# Patient Record
Sex: MALE | Race: BLACK OR AFRICAN AMERICAN | NOT HISPANIC OR LATINO | Employment: FULL TIME | URBAN - METROPOLITAN AREA
[De-identification: names, ages, dates, MRNs, and addresses within clinical notes are randomized per-mention and may not be internally consistent; named-entity substitution may affect disease eponyms.]

---

## 2017-03-27 ENCOUNTER — OFFICE VISIT - HEALTHEAST (OUTPATIENT)
Dept: FAMILY MEDICINE | Facility: CLINIC | Age: 47
End: 2017-03-27

## 2017-03-27 DIAGNOSIS — S83.411A SPRAIN OF MEDIAL COLLATERAL LIGAMENT OF RIGHT KNEE, INITIAL ENCOUNTER: ICD-10-CM

## 2017-03-27 ASSESSMENT — MIFFLIN-ST. JEOR: SCORE: 1871.17

## 2019-11-04 ENCOUNTER — HEALTH MAINTENANCE LETTER (OUTPATIENT)
Age: 49
End: 2019-11-04

## 2019-12-09 ENCOUNTER — OFFICE VISIT - HEALTHEAST (OUTPATIENT)
Dept: FAMILY MEDICINE | Facility: CLINIC | Age: 49
End: 2019-12-09

## 2019-12-09 DIAGNOSIS — Z12.11 SCREEN FOR COLON CANCER: ICD-10-CM

## 2019-12-09 DIAGNOSIS — M25.562 ACUTE PAIN OF LEFT KNEE: ICD-10-CM

## 2019-12-09 ASSESSMENT — MIFFLIN-ST. JEOR: SCORE: 1893.85

## 2019-12-26 ENCOUNTER — RECORDS - HEALTHEAST (OUTPATIENT)
Dept: ADMINISTRATIVE | Facility: OTHER | Age: 49
End: 2019-12-26

## 2019-12-26 ENCOUNTER — THERAPY VISIT (OUTPATIENT)
Dept: PHYSICAL THERAPY | Facility: CLINIC | Age: 49
End: 2019-12-26
Payer: COMMERCIAL

## 2019-12-26 DIAGNOSIS — M25.562 LEFT KNEE PAIN: ICD-10-CM

## 2019-12-26 PROCEDURE — 97110 THERAPEUTIC EXERCISES: CPT | Mod: GP | Performed by: PHYSICAL THERAPIST

## 2019-12-26 PROCEDURE — 97161 PT EVAL LOW COMPLEX 20 MIN: CPT | Mod: GP | Performed by: PHYSICAL THERAPIST

## 2019-12-26 ASSESSMENT — ACTIVITIES OF DAILY LIVING (ADL)
GO DOWN STAIRS: ACTIVITY IS SOMEWHAT DIFFICULT
STIFFNESS: THE SYMPTOM AFFECTS MY ACTIVITY MODERATELY
STAND: ACTIVITY IS SOMEWHAT DIFFICULT
WALK: ACTIVITY IS SOMEWHAT DIFFICULT
SIT WITH YOUR KNEE BENT: ACTIVITY IS FAIRLY DIFFICULT
RAW_SCORE: 41
GIVING WAY, BUCKLING OR SHIFTING OF KNEE: THE SYMPTOM AFFECTS MY ACTIVITY SLIGHTLY
RISE FROM A CHAIR: ACTIVITY IS MINIMALLY DIFFICULT
WEAKNESS: I HAVE THE SYMPTOM BUT IT DOES NOT AFFECT MY ACTIVITY
SWELLING: I DO NOT HAVE THE SYMPTOM
KNEEL ON THE FRONT OF YOUR KNEE: ACTIVITY IS FAIRLY DIFFICULT
PAIN: THE SYMPTOM AFFECTS MY ACTIVITY MODERATELY
HOW_WOULD_YOU_RATE_THE_CURRENT_FUNCTION_OF_YOUR_KNEE_DURING_YOUR_USUAL_DAILY_ACTIVITIES_ON_A_SCALE_FROM_0_TO_100_WITH_100_BEING_YOUR_LEVEL_OF_KNEE_FUNCTION_PRIOR_TO_YOUR_INJURY_AND_0_BEING_THE_INABILITY_TO_PERFORM_ANY_OF_YOUR_USUAL_DAILY_ACTIVITIES?: 30
KNEE_ACTIVITY_OF_DAILY_LIVING_SCORE: 58.57
HOW_WOULD_YOU_RATE_THE_OVERALL_FUNCTION_OF_YOUR_KNEE_DURING_YOUR_USUAL_DAILY_ACTIVITIES?: SEVERELY ABNORMAL
SQUAT: ACTIVITY IS FAIRLY DIFFICULT
GO UP STAIRS: ACTIVITY IS SOMEWHAT DIFFICULT
LIMPING: THE SYMPTOM AFFECTS MY ACTIVITY SLIGHTLY
KNEE_ACTIVITY_OF_DAILY_LIVING_SUM: 41
AS_A_RESULT_OF_YOUR_KNEE_INJURY,_HOW_WOULD_YOU_RATE_YOUR_CURRENT_LEVEL_OF_DAILY_ACTIVITY?: NEARLY NORMAL

## 2019-12-26 NOTE — LETTER
[facility]  [address] [place] [place] [address]  [phone]    2019    Re: Tushar Nix   :   1970  MRN:  [id number]   REFERRING PHYSICIAN:   Alice Domínguez    [facility]    Date of Initial Evaluation: 2019  Visits:  Rxs Used: 1  Reason for Referral:  Left knee pain    EVALUATION SUMMARY    [facility] Initial Evaluation  Subjective:  The history is provided by the patient. No  was used.   Type of problem:  Left knee   Condition occurred with:  Other reason and insidious onset. This is a new condition   Problem details: Pt reports he usually works out atleast 5 days per week. Pt reports he was running 5 of those days plus weight lifting prior to the knee pain starting (running atleast 2 miles). Pt reports this has been going on for over 2 months. Pt is really unsure of how this happened. Pt reports he was in [facility] during the month of August and noticed when he resumed his work out the following month (after returning to [place]) is around when it started. Pt reports that his left knee feels like it doesn't tolerate many positions for a long period of time. Pt describes left medial knee pain that is throbbing, intermittently. Pt reports always worse after it being bent for more than 10 minutes, always worse if straight for too long as well. Pt indicates pain with running that makes him limp, always. Pt reports always better after walking and loosening it up for a few minutes. Pt would like to be able to walk, sit and work out without increased pain..           Tushar Nix being seen for Right Knee Pain.   Problem began 10/26/2019. Where condition occurred: for unknown reasons.Problem occurred: Unsure  and reported as 6/10 on pain scale. General health as reported by patient is excellent. Health conditions: none indicated.  Medical allergies: none.  Surgeries include:  None.     Primary job tasks  include:  Computer work and prolonged sitting.  Pain quality: throbbing. and is intermittent.  Since onset symptoms are gradually worsening. Imaging testing: none.     Patient is Respiratory Therapist..   Barriers include:  None as reported by patient.  Red flags:  None as reported by patient.                                Re: Tushar Nix   :   1970    Objective:          Hip Evaluation  Hip Strength:    Abduction:  Left: 3+/5      Pain:strong/pain freeRight: 4/5     Pain:strong/pain free  Adduction:  Left: /5   Pain:strong/pain free  Internal Rotation:  Left: 4/5     Pain:strong/pain freeRight: 4+/5    Pain:strong/pain free  External Rotation:  Left: 3+/5    Pain: strong/pain free  Right: 4+/5    Pain: strong/pain free         Knee Evaluation:  ROM:    AROM  Extension:  Left: lacking 2*    Right:  3* hyperextension  Flexion: Left: 140    Right: 140    Pain: Increased pain during Left knee flexion.    Strength:   Extension:  Left: 4-/5   Strong/painful  Pain:      Right: 5/5   Strong/pain free  Pain:  Flexion:  Left: 4/5   Strong/pain free  Pain:      Right: 4+/5   Strong/pain free  Pain:    Quad Set Left:  Fair and delayed    Pain: +/-   Quad Set Right:  Good and WNL    Pain: -    Special Tests: Special test for knee: + hamstring tightness BLE. 90/90 testing lacking 45* extension L, lacking 30* extension.      Functional Testing:      Quad:    Single Leg Squat:  Left:         10  Significant loss of control, decreased hip/trunk flexion, excessive anterior knee excursion and femoral IR  Right:      25  Mild loss of control, excessive anterior knee excursion and decreased hip/trunk flexion  Bilateral Leg Squat:  70  Mild loss of control, excessive anterior knee excursion and decreased hip/trunk flexion          Assessment/Plan:    Patient is a 49 year old male with left side knee complaints.    Patient has the following significant findings with corresponding treatment plan.                Diagnosis 1:   Left Knee Pain  Pain -  manual therapy, self management, education and home program  Decreased ROM/flexibility - manual therapy, therapeutic exercise and therapeutic activity  Decreased strength - therapeutic exercise, therapeutic activities and home program        Re: Tushar Nix   :   1970    Previous and current functional limitations:  (See Goal Flow Sheet for this information)    Short term and Long term goals: (See Goal Flow Sheet for this information)     Communication ability:  Patient appears to be able to clearly communicate and understand verbal and written communication and follow directions correctly.  Treatment Explanation - The following has been discussed with the patient:   RX ordered/plan of care  Anticipated outcomes  Possible risks and side effects  This patient would benefit from PT intervention to resume normal activities.   Rehab potential is excellent.    Frequency:  1 X week, once daily  Duration:  for 6 weeks  Discharge Plan:  Achieve all LTG.  Independent in home treatment program.  Reach maximal therapeutic benefit.          Thank you for your referral.    INQUIRIES  Therapist: [facility]   [facility]  [address] [place] [place] [address]  Phone: [phone]

## 2019-12-26 NOTE — PROGRESS NOTES
[facility] Initial Evaluation  Subjective:  The history is provided by the patient. No  was used.   Type of problem:  Left knee   Condition occurred with:  Other reason and insidious onset. This is a new condition   Problem details: Pt reports he usually works out atleast 5 days per week. Pt reports he was running 5 of those days plus weight lifting prior to the knee pain starting (running atleast 2 miles). Pt reports this has been going on for over 2 months. Pt is really unsure of how this happened. Pt reports he was in [facility] during the month of August and noticed when he resumed his work out the following month (after returning to [place]) is around when it started. Pt reports that his left knee feels like it doesn't tolerate many positions for a long period of time. Pt describes left medial knee pain that is throbbing, intermittently. Pt reports always worse after it being bent for more than 10 minutes, always worse if straight for too long as well. Pt indicates pain with running that makes him limp, always. Pt reports always better after walking and loosening it up for a few minutes. Pt would like to be able to walk, sit and work out without increased pain..           Tushar Nix being seen for Right Knee Pain.   Problem began 10/26/2019. Where condition occurred: for unknown reasons.Problem occurred: Unsure  and reported as 6/10 on pain scale. General health as reported by patient is excellent. Health conditions: none indicated.  Medical allergies: none.  Surgeries include:  None.     Primary job tasks include:  Computer work and prolonged sitting.  Pain quality: throbbing. and is intermittent.  Since onset symptoms are gradually worsening. Imaging testing: none.     Patient is Respiratory Therapist..   Barriers include:  None as reported by patient.  Red flags:  None as reported by patient.                      Objective:  System                                            Hip Evaluation    Hip Strength:        Abduction:  Left: 3+/5      Pain:strong/pain freeRight: 4/5     Pain:strong/pain free  Adduction:  Left: /5   Pain:strong/pain free  Internal Rotation:  Left: 4/5     Pain:strong/pain freeRight: 4+/5    Pain:strong/pain free  External Rotation:  Left: 3+/5    Pain: strong/pain free  Right: 4+/5    Pain: strong/pain free                     Knee Evaluation:  ROM:    AROM      Extension:  Left: lacking 2*    Right:  3* hyperextension  Flexion: Left: 140    Right: 140    Pain: Increased pain during Left knee flexion.    Strength:     Extension:  Left: 4-/5   Strong/painful  Pain:      Right: 5/5   Strong/pain free  Pain:  Flexion:  Left: 4/5   Strong/pain free  Pain:      Right: 4+/5   Strong/pain free  Pain:    Quad Set Left:  Fair and delayed    Pain: +/-   Quad Set Right:  Good and WNL    Pain: -    Special Tests: Special test for knee: + hamstring tightness BLE. 90/90 testing lacking 45* extension L, lacking 30* extension.            Functional Testing:          Quad:    Single Leg Squat:  Left:         10  Significant loss of control, decreased hip/trunk flexion, excessive anterior knee excursion and femoral IR  Right:      25  Mild loss of control, excessive anterior knee excursion and decreased hip/trunk flexion  Bilateral Leg Squat:  70  Mild loss of control, excessive anterior knee excursion and decreased hip/trunk flexion              General     ROS    Assessment/Plan:    Patient is a 49 year old male with left side knee complaints.    Patient has the following significant findings with corresponding treatment plan.                Diagnosis 1:  Left Knee Pain  Pain -  manual therapy, self management, education and home program  Decreased ROM/flexibility - manual therapy, therapeutic exercise and therapeutic activity  Decreased strength - therapeutic exercise, therapeutic activities and home program      Previous and current functional limitations:  (See Goal Flow  Sheet for this information)    Short term and Long term goals: (See Goal Flow Sheet for this information)     Communication ability:  Patient appears to be able to clearly communicate and understand verbal and written communication and follow directions correctly.  Treatment Explanation - The following has been discussed with the patient:   RX ordered/plan of care  Anticipated outcomes  Possible risks and side effects  This patient would benefit from PT intervention to resume normal activities.   Rehab potential is excellent.    Frequency:  1 X week, once daily  Duration:  for 6 weeks  Discharge Plan:  Achieve all LTG.  Independent in home treatment program.  Reach maximal therapeutic benefit.    Please refer to the daily flowsheet for treatment today, total treatment time and time spent performing 1:1 timed codes.

## 2020-01-02 ENCOUNTER — THERAPY VISIT (OUTPATIENT)
Dept: PHYSICAL THERAPY | Facility: CLINIC | Age: 50
End: 2020-01-02
Payer: COMMERCIAL

## 2020-01-02 DIAGNOSIS — M25.562 LEFT KNEE PAIN: ICD-10-CM

## 2020-01-02 PROCEDURE — 97110 THERAPEUTIC EXERCISES: CPT | Mod: GP | Performed by: PHYSICAL THERAPIST

## 2020-01-30 ENCOUNTER — OFFICE VISIT - HEALTHEAST (OUTPATIENT)
Dept: FAMILY MEDICINE | Facility: CLINIC | Age: 50
End: 2020-01-30

## 2020-01-30 DIAGNOSIS — M25.562 CHRONIC PAIN OF LEFT KNEE: ICD-10-CM

## 2020-01-30 DIAGNOSIS — G89.29 CHRONIC PAIN OF LEFT KNEE: ICD-10-CM

## 2020-01-30 ASSESSMENT — MIFFLIN-ST. JEOR: SCORE: 1884.78

## 2020-02-03 ENCOUNTER — RECORDS - HEALTHEAST (OUTPATIENT)
Dept: ADMINISTRATIVE | Facility: OTHER | Age: 50
End: 2020-02-03

## 2020-02-05 ENCOUNTER — HOSPITAL ENCOUNTER (OUTPATIENT)
Dept: MRI IMAGING | Facility: CLINIC | Age: 50
Discharge: HOME | End: 2020-02-05
Attending: FAMILY MEDICINE

## 2020-02-05 ENCOUNTER — COMMUNICATION - HEALTHEAST (OUTPATIENT)
Dept: FAMILY MEDICINE | Facility: CLINIC | Age: 50
End: 2020-02-05

## 2020-02-05 DIAGNOSIS — M25.562 LEFT KNEE PAIN, UNSPECIFIED CHRONICITY: ICD-10-CM

## 2020-02-05 DIAGNOSIS — G89.29 CHRONIC PAIN OF LEFT KNEE: ICD-10-CM

## 2020-02-05 DIAGNOSIS — M25.562 CHRONIC PAIN OF LEFT KNEE: ICD-10-CM

## 2020-02-14 ENCOUNTER — RECORDS - HEALTHEAST (OUTPATIENT)
Dept: ADMINISTRATIVE | Facility: OTHER | Age: 50
End: 2020-02-14

## 2020-03-16 ENCOUNTER — COMMUNICATION - HEALTHEAST (OUTPATIENT)
Dept: FAMILY MEDICINE | Facility: CLINIC | Age: 50
End: 2020-03-16

## 2020-03-17 ENCOUNTER — OFFICE VISIT - HEALTHEAST (OUTPATIENT)
Dept: FAMILY MEDICINE | Facility: CLINIC | Age: 50
End: 2020-03-17

## 2020-03-17 DIAGNOSIS — Z01.818 PREOP GENERAL PHYSICAL EXAM: ICD-10-CM

## 2020-03-17 DIAGNOSIS — S83.282A TEAR OF LATERAL CARTILAGE OR MENISCUS OF KNEE, CURRENT, LEFT, INITIAL ENCOUNTER: ICD-10-CM

## 2020-03-17 LAB
ERYTHROCYTE [DISTWIDTH] IN BLOOD BY AUTOMATED COUNT: 12.4 % (ref 11–14.5)
HCT VFR BLD AUTO: 43.3 % (ref 40–54)
HGB BLD-MCNC: 14.6 G/DL (ref 14–18)
MCH RBC QN AUTO: 28.7 PG (ref 27–34)
MCHC RBC AUTO-ENTMCNC: 33.7 G/DL (ref 32–36)
MCV RBC AUTO: 85 FL (ref 80–100)
PLATELET # BLD AUTO: 188 THOU/UL (ref 140–440)
PMV BLD AUTO: 7.4 FL (ref 7–10)
RBC # BLD AUTO: 5.07 MILL/UL (ref 4.4–6.2)
WBC: 3.8 THOU/UL (ref 4–11)

## 2020-03-17 ASSESSMENT — MIFFLIN-ST. JEOR: SCORE: 1890.45

## 2020-05-12 ENCOUNTER — COMMUNICATION - HEALTHEAST (OUTPATIENT)
Dept: FAMILY MEDICINE | Facility: CLINIC | Age: 50
End: 2020-05-12

## 2020-05-13 ENCOUNTER — OFFICE VISIT - HEALTHEAST (OUTPATIENT)
Dept: FAMILY MEDICINE | Facility: CLINIC | Age: 50
End: 2020-05-13

## 2020-05-13 DIAGNOSIS — Z01.818 PREOP GENERAL PHYSICAL EXAM: ICD-10-CM

## 2020-05-13 DIAGNOSIS — S83.282D TEAR OF LATERAL CARTILAGE OR MENISCUS OF KNEE, CURRENT, LEFT, SUBSEQUENT ENCOUNTER: ICD-10-CM

## 2020-05-13 ASSESSMENT — MIFFLIN-ST. JEOR: SCORE: 1893.32

## 2020-05-16 ENCOUNTER — OFFICE VISIT - HEALTHEAST (OUTPATIENT)
Dept: FAMILY MEDICINE | Facility: CLINIC | Age: 50
End: 2020-05-16

## 2020-05-16 DIAGNOSIS — Z01.818 PREOP GENERAL PHYSICAL EXAM: ICD-10-CM

## 2020-05-17 LAB
SARS-COV-2 PCR COMMENT: NORMAL
SARS-COV-2 RNA SPEC QL NAA+PROBE: NEGATIVE
SARS-COV-2 VIRUS SPECIMEN SOURCE: NORMAL

## 2020-05-18 ENCOUNTER — RECORDS - HEALTHEAST (OUTPATIENT)
Dept: ADMINISTRATIVE | Facility: OTHER | Age: 50
End: 2020-05-18

## 2020-06-01 ENCOUNTER — RECORDS - HEALTHEAST (OUTPATIENT)
Dept: ADMINISTRATIVE | Facility: OTHER | Age: 50
End: 2020-06-01

## 2020-06-29 ENCOUNTER — RECORDS - HEALTHEAST (OUTPATIENT)
Dept: ADMINISTRATIVE | Facility: OTHER | Age: 50
End: 2020-06-29

## 2020-11-16 ENCOUNTER — HEALTH MAINTENANCE LETTER (OUTPATIENT)
Age: 50
End: 2020-11-16

## 2021-04-19 ENCOUNTER — RECORDS - HEALTHEAST (OUTPATIENT)
Dept: ADMINISTRATIVE | Facility: OTHER | Age: 51
End: 2021-04-19

## 2021-04-28 ENCOUNTER — RECORDS - HEALTHEAST (OUTPATIENT)
Dept: ADMINISTRATIVE | Facility: OTHER | Age: 51
End: 2021-04-28

## 2021-05-24 ENCOUNTER — OFFICE VISIT - HEALTHEAST (OUTPATIENT)
Dept: FAMILY MEDICINE | Facility: CLINIC | Age: 51
End: 2021-05-24

## 2021-05-24 DIAGNOSIS — R76.11 NONSPECIFIC REACTION TO TUBERCULIN SKIN TEST: ICD-10-CM

## 2021-05-24 DIAGNOSIS — Z00.00 ROUTINE GENERAL MEDICAL EXAMINATION AT A HEALTH CARE FACILITY: ICD-10-CM

## 2021-05-24 DIAGNOSIS — E78.89 OTHER LIPOPROTEIN METABOLISM DISORDERS: ICD-10-CM

## 2021-05-24 LAB
ALBUMIN SERPL-MCNC: 3.7 G/DL (ref 3.5–5)
ALP SERPL-CCNC: 85 U/L (ref 45–120)
ALT SERPL W P-5'-P-CCNC: 18 U/L (ref 0–45)
ANION GAP SERPL CALCULATED.3IONS-SCNC: 11 MMOL/L (ref 5–18)
AST SERPL W P-5'-P-CCNC: 17 U/L (ref 0–40)
BILIRUB SERPL-MCNC: 0.6 MG/DL (ref 0–1)
BUN SERPL-MCNC: 11 MG/DL (ref 8–22)
CALCIUM SERPL-MCNC: 8.6 MG/DL (ref 8.5–10.5)
CHLORIDE BLD-SCNC: 110 MMOL/L (ref 98–107)
CHOLEST SERPL-MCNC: 201 MG/DL
CO2 SERPL-SCNC: 24 MMOL/L (ref 22–31)
CREAT SERPL-MCNC: 1.04 MG/DL (ref 0.7–1.3)
ERYTHROCYTE [DISTWIDTH] IN BLOOD BY AUTOMATED COUNT: 13.8 % (ref 11–14.5)
FASTING STATUS PATIENT QL REPORTED: YES
GFR SERPL CREATININE-BSD FRML MDRD: >60 ML/MIN/1.73M2
GLUCOSE BLD-MCNC: 90 MG/DL (ref 70–125)
HCT VFR BLD AUTO: 44.5 % (ref 40–54)
HDLC SERPL-MCNC: 34 MG/DL
HGB BLD-MCNC: 14.2 G/DL (ref 14–18)
LDLC SERPL CALC-MCNC: 143 MG/DL
MCH RBC QN AUTO: 27.3 PG (ref 27–34)
MCHC RBC AUTO-ENTMCNC: 31.9 G/DL (ref 32–36)
MCV RBC AUTO: 85 FL (ref 80–100)
PLATELET # BLD AUTO: 213 THOU/UL (ref 140–440)
PMV BLD AUTO: 9.2 FL (ref 7–10)
POTASSIUM BLD-SCNC: 4.7 MMOL/L (ref 3.5–5)
PROT SERPL-MCNC: 6.7 G/DL (ref 6–8)
PSA SERPL-MCNC: 1.1 NG/ML (ref 0–3.5)
RBC # BLD AUTO: 5.21 MILL/UL (ref 4.4–6.2)
SODIUM SERPL-SCNC: 145 MMOL/L (ref 136–145)
TRIGL SERPL-MCNC: 120 MG/DL
WBC: 3.8 THOU/UL (ref 4–11)

## 2021-05-24 ASSESSMENT — MIFFLIN-ST. JEOR: SCORE: 1872.52

## 2021-05-25 ENCOUNTER — RECORDS - HEALTHEAST (OUTPATIENT)
Dept: ADMINISTRATIVE | Facility: CLINIC | Age: 51
End: 2021-05-25

## 2021-05-30 VITALS — WEIGHT: 215 LBS | BODY MASS INDEX: 28.49 KG/M2 | HEIGHT: 73 IN

## 2021-06-03 ENCOUNTER — RECORDS - HEALTHEAST (OUTPATIENT)
Dept: ADMINISTRATIVE | Facility: CLINIC | Age: 51
End: 2021-06-03

## 2021-06-04 VITALS
BODY MASS INDEX: 29.06 KG/M2 | HEIGHT: 73 IN | DIASTOLIC BLOOD PRESSURE: 70 MMHG | TEMPERATURE: 98.7 F | HEART RATE: 64 BPM | SYSTOLIC BLOOD PRESSURE: 110 MMHG | WEIGHT: 219.25 LBS | RESPIRATION RATE: 12 BRPM

## 2021-06-04 VITALS
BODY MASS INDEX: 29.16 KG/M2 | RESPIRATION RATE: 16 BRPM | SYSTOLIC BLOOD PRESSURE: 110 MMHG | HEIGHT: 73 IN | WEIGHT: 220 LBS | HEART RATE: 64 BPM | DIASTOLIC BLOOD PRESSURE: 62 MMHG

## 2021-06-04 VITALS
HEART RATE: 68 BPM | DIASTOLIC BLOOD PRESSURE: 72 MMHG | OXYGEN SATURATION: 100 % | RESPIRATION RATE: 16 BRPM | SYSTOLIC BLOOD PRESSURE: 112 MMHG | HEIGHT: 73 IN | WEIGHT: 219.4 LBS | BODY MASS INDEX: 29.08 KG/M2

## 2021-06-04 VITALS
BODY MASS INDEX: 28.89 KG/M2 | RESPIRATION RATE: 16 BRPM | HEIGHT: 73 IN | HEART RATE: 70 BPM | TEMPERATURE: 98.6 F | DIASTOLIC BLOOD PRESSURE: 62 MMHG | SYSTOLIC BLOOD PRESSURE: 118 MMHG | WEIGHT: 218 LBS

## 2021-06-04 NOTE — PROGRESS NOTES
"Assessment/ Plan     1. Acute pain of left knee  Patient is had about a 1 month history of acute left knee pain.  His x-ray was normal today and exam was fairly unremarkable.  We discussed conservative therapy including rest for at least several days.  Would recommend icing 3 times daily and scheduled ibuprofen for several days.  Would switch his workout so he is not doing as much weightbearing exercise.  If no improvement over the next week to 2 weeks, then would recommend formal physical therapy.  - XR Knee Left Plus [place] [facility]  - Ambulatory referral to Adult PT- Internal    2. Screen for colon cancer  He is due for colonoscopy and is willing to do this.  - Ambulatory referral for Colonoscopy      Subjective:       Tushar Nix is a 49 y.o. male who presents for evaluation of left knee pain.  He does not remember any trauma or injury that started this.  He states that he goes to the gym almost every day.  He typically runs on the treadmill several miles and he has been doing this for about 4 years.  So, he has had to switch to just walking because it is too uncomfortable.  He will feel it on both the medial and lateral aspects.  He states is a throbbing sensation.  Is worse when he is at his knee straight.  Is not feeling unstable, locking or giving out.  He is not noticed any swelling or redness.    Relevant past medical, family, surgical, and social history reviewed with patient, unless noted in HPI, not pertinent for this visit.  Medications were discussed and reconciled.   Review of Systems   A 12 point comprehensive review of systems was negative except as noted.      No current outpatient medications on file.     No current facility-administered medications for this visit.        Objective:      /62   Pulse 64   Resp 16   Ht 6' 0.5\" (1.842 m)   Wt 220 lb (99.8 kg)   BMI 29.43 kg/m        General appearance: alert, appears stated age and cooperative  Left knee: No visible deformity or swelling. "  There is no effusion.  Minimal tenderness along the medial joint space.  No pain over the patella or patellar tendon.  Good range of motion with flexion extension.  Good endpoints with varus and valgus stress.  Negative meniscal signs.  Negative Lockman, negative anterior drawer.    X-ray left knee, personally reviewed: Normal         No results found for this or any previous visit (from the past 168 hour(s)).       This note has been dictated using voice recognition software. Any grammatical or context distortions are unintentional and inherent to the software

## 2021-06-05 NOTE — TELEPHONE ENCOUNTER
Please let pt know that MRI shows a tear of the meniscus.  Would recommend referral to ortho for discussion of repair.  Please provide info for [facility]

## 2021-06-05 NOTE — TELEPHONE ENCOUNTER
Reason contacted:  MRI results  Information relayed:  MD message below relayed to pt  Additional questions:  No  Further follow-up needed:  Yes, pt will schedule an appt with [facility].  Okay to leave a detailed message:  NA

## 2021-06-05 NOTE — PROGRESS NOTES
"Assessment/ Plan     1. Chronic pain of left knee  Patient has had ongoing left knee pain despite conservative therapy.  He felt that physical therapy actually made things slightly worse.  He is now having a hard time sleeping.  I do think he has a component of peds anserine bursitis and I gave him a handout on this and suggested he continue icing that specific area 20 minutes at a time, 3 times a day.  That does not explain all his symptoms as he is having difficulty with weightbearing.  Would recommend imaging with an MRI at this point with further plan pending those results.  - MR Knee Without Contrast Left; Future      Subjective:       Tushar Nix is a 49 y.o. male who presents for ongoing knee pain.  Patient has had knee pain starting in November.  He denies any trauma or injury.  He had been doing a lot of walking and running on the treadmill.  We started out with conservative therapy and he has gone to a couple sessions of physical therapy.  He states the physical therapy actually tended to make things worse.  He feels like the pain has worsened to the point where he is no longer going to the gym.  He has not worked out in 3 to 4 weeks.  It is keeping him up at night and he has a throbbing sensation.  He points right to the peds anserine bursa is where a lot of his pain originates.  However he is having some pain along the medial joint space with weightbearing.  He is not feeling like it is locking or giving out on him.    Relevant past medical, family, surgical, and social history reviewed with patient, unless noted in HPI, not pertinent for this visit.  Medications were discussed and reconciled.   Review of Systems   A 12 point comprehensive review of systems was negative except as noted.      No current outpatient medications on file.     No current facility-administered medications for this visit.        Objective:      /62   Pulse 70   Temp 98.6  F (37  C) (Oral)   Resp 16   Ht 6' 0.5\" " (1.842 m)   Wt 218 lb (98.9 kg)   BMI 29.16 kg/m        General appearance: alert, appears stated age and cooperative  Left knee: On inspection, there is no visible swelling or deformity.  To palpation he is tender over the peds anserine bursae.  He has good range of motion with flexion and extension.  Negative anterior drawer.  Good endpoints with varus and valgus stress.  He does have some positive meniscal signs along the medial aspect.      No results found for this or any previous visit (from the past 168 hour(s)).       This note has been dictated using voice recognition software. Any grammatical or context distortions are unintentional and inherent to the software

## 2021-06-05 NOTE — TELEPHONE ENCOUNTER
Attempted to call pt, no answer, no voicemail.  If pt should call back, please relay MD message below and give pt number for [facility] ([phone]).  Please document call was returned.

## 2021-06-06 NOTE — TELEPHONE ENCOUNTER
Patient returned call, no available times with Dr. Ansari to schedule on. Please call patient back with date and time. Ok to leave detailed message.

## 2021-06-06 NOTE — PROGRESS NOTES
Preoperative Exam    Scheduled Procedure: Left knee surgery   Surgery Date:  3/23/20  Surgery Location: [facility]    Surgeon:  Dr vazquez    Assessment/Plan:     1. Preop general physical exam  Patient approved for surgery with general anesthesia.  He was instructed to stop all supplements 1 week prior to surgery.  - HM2(CBC w/o Differential)    2. Tear of lateral cartilage or meniscus of knee, current, left, initial encounter       Surgical Procedure Risk: Low (reported cardiac risk generally < 1%)  Have you had prior anesthesia?: Yes  Have you or any family members had a previous anesthesia reaction:  No  Do you or any family members have a history of a clotting or bleeding disorder?: No  Cardiac Risk Assessment: no increased risk for major cardiac complications    APPROVAL GIVEN to proceed with proposed procedure, without further diagnostic evaluation      Functional Status: Independent  Patient plans to recover at home with family.     Subjective:      Tushar Nix is a 49 y.o. male who presents for a preoperative consultation.  He is a very healthy gentleman who started having knee pain in November.  He had no specific injury, just gradual increase in symptoms.  MRI shows a medial meniscal tear.  He has had a minor surgery in the past without any issue with anesthesia.  He is very healthy, no chronic medical issues.  He does not take any medications and is a non smoker.  He has not known coronary artery disease and denies chest pain and shortness of breath. He has had no recent exposures or illnesses.  He is up to date on immunizations. He is a non smoker.    All other systems reviewed and are negative, other than those listed in the HPI.    Pertinent History  Do you have difficulty breathing or chest pain after walking up a flight of stairs: No  History of obstructive sleep apnea: No  Steroid use in the last 6 months: No  Frequent Aspirin/NSAID use: No  Prior Blood Transfusion:  No  Prior Blood Transfusion Reaction: No  If for some reason prior to, during or after the procedure, if it is medically indicated, would you be willing to have a blood transfusion?:  There is no transfusion refusal.    No current outpatient medications on file.     No current facility-administered medications for this visit.         No Known Allergies    Patient Active Problem List   Diagnosis     Latent Tuberculosis     Launois-Bensaude's Lipomatosis     Glandularis Cystitis       Past Medical History:   Diagnosis Date     Lipomatosis        No past surgical history on file.    Social History     Socioeconomic History     Marital status:      Spouse name: Not on file     Number of children: Not on file     Years of education: Not on file     Highest education level: Not on file   Occupational History     Not on file   Social Needs     Financial resource strain: Not on file     Food insecurity     Worry: Not on file     Inability: Not on file     Transportation needs     Medical: Not on file     Non-medical: Not on file   Tobacco Use     Smoking status: Never Smoker     Smokeless tobacco: Never Used   Substance and Sexual Activity     Alcohol use: Not on file     Drug use: Not on file     Sexual activity: Not on file   Lifestyle     Physical activity     Days per week: Not on file     Minutes per session: Not on file     Stress: Not on file   Relationships     Social connections     Talks on phone: Not on file     Gets together: Not on file     Attends Jew service: Not on file     Active member of club or organization: Not on file     Attends meetings of clubs or organizations: Not on file     Relationship status: Not on file     Intimate partner violence     Fear of current or ex partner: Not on file     Emotionally abused: Not on file     Physically abused: Not on file     Forced sexual activity: Not on file   Other Topics Concern     Not on file   Social History Narrative     Not on file  "      Patient Care Team:  Alice Domínguez MD as PCP - General (Family Medicine)  Alice Domínguez MD as Assigned PCP          Objective:     Vitals:    03/17/20 0827   BP: 110/70   Pulse: 64   Resp: 12   Temp: 98.7  F (37.1  C)   TempSrc: Oral   Weight: 219 lb 4 oz (99.5 kg)   Height: 6' 0.5\" (1.842 m)         Physical Exam:  Physical Exam    Physical Exam:  General Appearance: Alert, cooperative, no distress, appears stated age  Head: Normocephalic, without obvious abnormality, atraumatic  Eyes: PERRL, conjunctiva/corneas clear, EOM's intact  Ears: Normal TM's and external ear canals, both ears  Nose: Nares normal, septum midline,mucosa normal, no drainage  Throat: Lips, mucosa, and tongue normal; teeth and gums normal  Neck: Supple, symmetrical, trachea midline, no adenopathy; thyroid: not enlarged, symmetric, no tenderness/mass/nodules; no carotid bruit  Back: Symmetric, no curvature, ROM normal, no CVA tenderness  Lungs: Clear to auscultation bilaterally, respirations unlabored  Heart: Regular rate and rhythm, S1 and S2 normal, no murmur, rub, or gallop,   Abdomen: Soft, non-tender, bowel sounds active all four quadrants,  no masses, no organomegaly palpation.  No adnexal mass or tenderness.  Extremities: Extremities normal, atraumatic, no cyanosis or edema  Skin: Skin color, texture, turgor normal, no rashes or lesions  Lymph nodes: Cervical, supraclavicular, and axillary nodes normal  Neurologic: Normal        Patient Instructions      Before Your Surgery       Call your surgeon if there is any change in your health. This includes signs of a cold or flu (such as a sore throat, runny nose, cough, rash or fever).       Do not smoke, drink alcohol or take over the counter medicine (unless your surgeon or primary care doctor tells you to) for the 24 hours before and after surgery.       If you take prescribed drugs: Follow your doctor s orders about which medicines to take and which to stop until after " surgery.      Eating and drinking prior to surgery: follow the instructions from your surgeon.      Take a shower or bath the night before surgery. Use the soap your surgeon gave you to gently clean your skin. If you do not have soap from your surgeon, use your regular soap. Do not shave or scrub the surgery site. Wear clean pajamas and have clean sheets on your bed.             Hold all supplements, aspirin and NSAIDs for 7 days prior to surgery.    Follow your surgeon's direction on when to stop eating and drinking prior to surgery.    Your surgeon will be managing your pain after your surgery.          Labs:  No results found for this or any previous visit (from the past 24 hour(s)).    Immunization History   Administered Date(s) Administered     Influenza, inj, historic,unspecified 10/04/2013, 10/01/2019     Influenza, seasonal,quad inj 6-35 mos 10/21/2013     Td,adult,historic,unspecified 06/03/2008     Tdap 06/03/2008           Electronically signed by Alice Domínguez MD 03/17/20 8:29 AM

## 2021-06-06 NOTE — TELEPHONE ENCOUNTER
Left message for pt to call back.  Dr. Domínguez is out of the office this week. Ok to use a hold spot on Dr. Ansari' schedule in the afternoon on 3/17-3/20.

## 2021-06-06 NOTE — TELEPHONE ENCOUNTER
New Appointment Needed  What is the reason for the visit:    Pre-Op Appt Request  When is the surgery? :  03.23.20  Where is the surgery?:   [facility] in [place]  Who is the surgeon? :  Patient did not have the Dr's name  What type of surgery is being done?: knee surgery   Provider Preference: PCP or anybody available  How soon do you need to be seen?: before the procedure- open availability.  Waitlist offered?: No  Okay to leave a detailed message:  Yes

## 2021-06-08 NOTE — TELEPHONE ENCOUNTER
Called and spoke with pt. He is scheduled tomorrow (5/13/20) at the [facility] with Little Romero CNP for a pre-op.   Continuity of Care Document (CCD)

 Created on:January 3, 2020



Patient:David Lemus

Sex:Male

:1955

External Reference #:[id number]





Demographics







 Address  [address] [place] [address]

 

 Home Phone  [phone]

 

 Mobile Phone  [phone]

 

 Preferred Language  en

 

 Marital Status  Declined to Specify/Unknown

 

 Mormonism Affiliation  Unknown

 

 Race  Unknown

 

 Ethnic Group  Declined to Specify/Unknown









Author







 Name  Brijesh Townsend MD

 

 Address  [address]



   [place] [address]









Care Team Providers







 Name  Role  Phone

 

 Jonah Jeffery MD - Family Medicine  Care Team Information   [phone]
[phone]

 

 Kimberly Morales N.P. - Nurse  Care Team Information   [phone]
[phone]



 Practitioner    









Problems







 Description

 

 No Information Available







Social History







 Type  Date  Description  Comments

 

 Birth Sex    Unknown  

 

 Tobacco Use  Start: Unknown End:  Former Cigarette Smoker  Quit at 22.



   Unknown    

 

 Tobacco Use  Start: Unknown  Never Smoked Cigars  

 

 Tobacco Use  Start: Unknown  Never Smoked A Pipe  

 

 Smokeless Tobacco    Never Used Smokeless Tobacco  

 

 ETOH Use    Currently Consumes Alcohol  







Allergies, Adverse Reactions, Alerts







 Description

 

 No Known Drug Allergies







Medications







 Active Medications  SIG  Qnty  Indications  Ordering Provider  Date

 

 Ativan  1 mg by mouth 30  1tabs    Brijesh Townsend MD  2019



       1mg Tablets  min prior to        



   procedure        

 

 Meclizine HCL  take 1 tablet by      Unknown  



              25mg  mouth three times        



 Tablets  a day if needed        



           

 

 Alprazolam        Rin Le  



           0.5mg        NP  



 Tablets          



           

 

 Tadalafil  Take 1 Tablet By      Unknown  



          5mg Tablets  Mouth Once A Day.        



           









 History Medications









 Prednisone  40mg by mouth  15tabs  H90.5  Brijesh Townsend MD  2019 -



            50mg  once per day in        2019



 Tablets  in the morning x        



   5days 20 mg by        



   mouth once per        



   dayx 5days then        



   d/c        







Immunizations







 Description

 

 No Information Available







Vital Signs







 Date  Vital  Result  Comment

 

 2020 10:06am  Weight  230.00 lb  









 Weight  104.328 kg  

 

 Height  75 inches  6'3"

 

 Height in cm's  190.5 cm  

 

 BMI (Body Mass Index)  28.7 kg/m2  









 2019  9:57am  Weight  230.00 lb  









 Weight  104.328 kg  

 

 Height  75 inches  6'3"

 

 Height in cm's  190.5 cm  

 

 BMI (Body Mass Index)  28.7 kg/m2  







Results







 Test  Acquired  Facility  Test  Result  H/L  Range  Note



   Date            

 

 Laboratory  2019  [facility]  Blood Urea  22 mg/dL  
Normal  6-24  



 test finding    c/o Department of Laboratories  Nitrogen [place]        



     [place] [address] [phone]          

 

 Creatinine  2019  [facility]  Creatinine  1.00  
Normal  0.67-1.1  



     c/o Department of Laboratories    mg/dL    7  



     [place] [address] [phone]          









 Egfr Non-  74.8    >60  

 

 Egfr   90.5    >60  1









 1  *******Because ethnic data is not always readily available,



   this report includes an eGFR for both -Americans and



   non- Americans.****



   The National Kidney Disease Education Program (NKDEP) does



   not endorse the use of the MDRD equation for patients that



   are not between the ages of 18 and 70, are pregnant, have



   extremes of body size, muscle mass, or nutritional status,



   or are non- or non-.



   According to the National Kidney Foundation, irrespective of



   diagnosis, the stage of the disease is based on the level of



   kidney function:



   Stage Description                      GFR(mL/min/1.73 m(2))



   1     Kidney damage with normal or decreased GFR       90



   2     Kidney damage with mild decrease in GFR          60-89



   3     Moderate decrease in GFR                         30-59



   4     Severe decrease in GFR                           15-29



   5     Kidney failure                       <15 (or dialysis)







Procedures







 Date  Code  Description  Status

 

 2020  71954  Tympanometry  Completed

 

 2020  07105  Comprehensive Audiogram  Completed

 

 2019  35694  Tympanometry  Completed

 

 2019  10694  Comprehensive Audiogram  Completed







Medical Devices







 Description

 

 No Information Available







Encounters







 Type  Date  Location  Provider  Dx  Diagnosis

 

 Office Visit  2020  Shireen,Brijesh Pierson  H90.A22  Snsrnrl hear



   10:00a  08  MD    loss, uni, l ear,



           with rstrcd hear



           cntra side









 H90.5  Unspecified sensorineural hearing loss

 

 H93.12  Tinnitus, left ear









 Office Visit  2019  [place],After  Brijesh Townsend  H90.A22  Snsrnrl hear 
loss,



   10:00a  08  MD    uni, l ear, with



           rstrcd hear cntra



           side

 

 Office Visit  2019  [place],After  Brijesh Townsend  H90.5  Unspecified



   8:30a  08  MD    sensorineural



           hearing loss









 H93.12  Tinnitus, left ear







Assessments







 Date  Code  Description  Provider

 

 2020  H90.A22  Sensorineural hearing loss, unilateral, left  [place], 
Brijesh ROMERO



     ear, with restricted hearing on the  



     contralateral side  

 

 2020  H90.5  Unspecified sensorineural hearing loss  Brijesh Townsend MD

 

 2020  H93.12  Tinnitus, left ear  Ruparelia, Brijesh ROMERO

 

 2019  H90.A22  Sensorineural hearing loss, unilateral, left  Ruparrosa, 
Brijesh ROMERO



     ear, with restricted hearing on the  



     contralateral side  

 

 2019  H90.A22  Sensorineural hearing loss, unilateral, left  Maryparas Vela
, AU.D



     ear, with restricted hearing on the  



     contralateral side  

 

 2019  H90.5  Unspecified sensorineural hearing loss  Brijesh Townsend MD

 

 2019  H93.12  Tinnitus, left ear  Brijesh Townsend MD







Plan of Treatment

2020 - Brijesh Townsend MDH90.A22 Sensorineural hearing loss, unilateral, 
left ear, with restricted hearing on the contralateral sideH90.5 Unspecified 
sensorineural hearing lossComments:I suggest repeating the audiogram in 6 
months time if it remains stable he may be a candidate for bilateral hearing 
aids.  No evidence of retrocochlear pathology this may be hydrops.H93.12 
Tinnitus, left ear



Functional Status







 Description

 

 No Information Available







Mental Status







 Description

 

 No Information Available







Referrals







 Description

 

 No Information Available

## 2021-06-08 NOTE — TELEPHONE ENCOUNTER
New Appointment Needed  What is the reason for the visit:    Pre-Op Appt Request  When is the surgery? :  Monday 05/18    Where is the surgery?:   [facility]    Who is the surgeon? :  Patient unsure of surgeons name    What type of surgery is being done?: Knee Surgery    Provider Preference: Any available     How soon do you need to be seen?: Asap    Waitlist offered?: No    Okay to leave a detailed message:  Yes

## 2021-06-09 NOTE — PROGRESS NOTES
Assessment/ Plan     1. Sprain of medial collateral ligament of right knee, initial encounter  Patient symptoms and exam are consistent with likely sprain of the MCL of the right knee.  Discussed conservative therapy including aggressive icing, 20 minutes at a time, 3 times a day.  He will continue with ibuprofen.  We will have him avoid all exercise for the next 2 weeks until things are feeling better.  I gave him a handout with some rehab exercises he can start doing.  If he is having no improvement in symptoms over the next 2 weeks, he will call me and I would put in a referral for formal physical therapy.  If things are feeling better, discussed gradual return to his workouts, starting with biking and gradual return to the treadmill.  I also gave him a copy of his labs from his most recent physical exam as he was inquiring about the results.      Subjective:       Tushar Nix is a 46 y.o. male who presents for about a 10 day history of right knee pain.  Patient states this started after he was getting out of his car, he can have stretched his knee out.  This was on his way going to work.  Several hours later, he had swelling and pain of the knee.  Most of the pain is been along the medial aspect of the knee.  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  It bothers him mostly when he is laying down at night.  It also hurts a lot to run on the treadmill.  He typically works out 4 nights a week.  He switched over to the bike and that has been going okay.  He said no prior knee problems.  Denies any major trauma or fall.  He has been using ibuprofen and Tylenol.  It is not locking up or giving out on him.  It does not feel unstable.    The following portions of the patient's history were reviewed and updated as appropriate: allergies, current medications, past family history, past medical history, past social history, past surgical history and problem list.    Review of Systems   A 12 point  "comprehensive review of systems was negative except as noted.      No current outpatient prescriptions on file.     No current facility-administered medications for this visit.        Objective:      /76 (Patient Site: Left Arm, Patient Position: Sitting, Cuff Size: Adult Regular)  Pulse 64  Temp 98  F (36.7  C) (Oral)   Resp 12  Ht 6' 0.5\" (1.842 m)  Wt 215 lb (97.5 kg)  BMI 28.76 kg/m2      General appearance: alert, appears stated age and cooperative  Right knee: No visible deformity, no effusion.  He is tender along the medial knee to palpation.  He has good range of motion with flexion and extension.  He has some discomfort along the medial knee with valgus stress, but has good endpoints.  Negative Lachman, negative anterior drawer.  Negative meniscal signs.    No results found for this or any previous visit (from the past 168 hour(s)).       This note has been dictated using voice recognition software. Any grammatical or context distortions are unintentional and inherent to the software  "

## 2021-06-17 NOTE — PROGRESS NOTES
Assessment/Plan     1. Routine general medical examination at a health care facility  Tushar presents for his annual exam.  He is due for his tetanus booster today.  We will do fasting blood work including PSA.  His next colonoscopy is due in 2025.  - Comprehensive Metabolic Panel  - HM2(CBC w/o Differential)  - Lipid Cascade  - PSA (Prostatic-Specific Antigen), Annual Screen    2. Launois-Bensahafsa's Lipomatosis  He was found to have this issue by urology.  He had normal urologic testing and he currently has very minimal symptoms.  We will continue to monitor for now and if worsening symptoms or go back to urology.    3. Latent Tuberculosis  No treatment was recommended.  He is completely asymptomatic.      Subjective:      Tushar Nix is a 50 y.o. male who presents for an annual exam.  Overall, he is doing fairly well.  The last time I saw him was for preop for a torn meniscus in his knee.  He states he recovered nicely but still has some pain with impact sports such as running.  He has been back to see his orthopedist and had a cortisone injection.  He states otherwise he is feeling well.  He has latent TB on his problems that he states it was never recommended that he have any treatment.  He is completely asymptomatic.  He did see urologist for this lipomatosis in the past and had a very thorough work-up.  He occasionally will have some urinary frequency, but its not severe and not problematic.  Healthy Habits:   Regular Exercise: Yes  Sunscreen Use: Yes  Healthy Diet: Yes  Dental Visits Regularly: No  Seat Belt: Yes  Sexually active: Yes  Monthly Self Testicular Exams:  Yes  Hemoccults: No  Flex Sig: No  Colonoscopy: Yes  Lipid Profile: Yes  Glucose Screen: Yes  Prevention of Osteoporosis: No  Last Dexa: No        Immunization History   Administered Date(s) Administered     Influenza O4e6-35, 10/15/2009     Influenza, inj, historic,unspecified 10/04/2013, 10/01/2019     Influenza, seasonal,quad inj 6-35 mos  10/21/2013     MMR 11/20/2001     Td,adult,historic,unspecified 06/03/2008     Tdap 06/03/2008     Immunization status: due today.    No exam data present    No current outpatient medications on file.     No current facility-administered medications for this visit.      Past Medical History:   Diagnosis Date     Lipomatosis      No past surgical history on file.  Patient has no known allergies.  No family history on file.  Social History     Socioeconomic History     Marital status:      Spouse name: Not on file     Number of children: Not on file     Years of education: Not on file     Highest education level: Not on file   Occupational History     Not on file   Social Needs     Financial resource strain: Not on file     Food insecurity     Worry: Not on file     Inability: Not on file     Transportation needs     Medical: Not on file     Non-medical: Not on file   Tobacco Use     Smoking status: Never Smoker     Smokeless tobacco: Never Used   Substance and Sexual Activity     Alcohol use: Not on file     Drug use: Not on file     Sexual activity: Not on file   Lifestyle     Physical activity     Days per week: Not on file     Minutes per session: Not on file     Stress: Not on file   Relationships     Social connections     Talks on phone: Not on file     Gets together: Not on file     Attends [employer] service: Not on file     Active member of club or organization: Not on file     Attends meetings of clubs or organizations: Not on file     Relationship status: Not on file     Intimate partner violence     Fear of current or ex partner: Not on file     Emotionally abused: Not on file     Physically abused: Not on file     Forced sexual activity: Not on file   Other Topics Concern     Not on file   Social History Narrative     Not on file       Review of Systems  General:  Denies problem  Eyes: Denies problem  Ears/Nose/Throat: Denies problem  Cardiovascular: Denies problem  Respiratory:  Denies  problem  Gastrointestinal:  Denies problem  Genitourinary: Denies problem  Musculoskeletal:  Denies problem  Skin: Denies problem  Neurologic: Denies problem  Psychiatric: Denies problem  Endocrine: Denies problem  Heme/Lymphatic: Denies problem   Allergic/Immunologic: Denies problem        Objective:     There were no vitals filed for this visit.  There is no height or weight on file to calculate BMI.    Physical  General Appearance: Alert, cooperative, no distress, appears stated age  Head: Normocephalic, without obvious abnormality, atraumatic  Eyes: PERRL, conjunctiva/corneas clear, EOM's intact  Ears: Normal TM's and external ear canals, both ears  Nose: Nares normal, septum midline,mucosa normal, no drainage  Throat: Lips, mucosa, and tongue normal; teeth and gums normal  Neck: Supple, symmetrical, trachea midline, no adenopathy;  thyroid: not enlarged, symmetric, no tenderness/mass/nodules; no carotid bruit or JVD  Back: Symmetric, no curvature, ROM normal, no CVA tenderness  Lungs: Clear to auscultation bilaterally, respirations unlabored  Heart: Regular rate and rhythm, S1 and S2 normal, no murmur, rub, or gallop,  Abdomen: Soft, non-tender, bowel sounds active all four quadrants,  no masses, no organomegaly   Musculoskeletal: Normal range of motion. No joint swelling or deformity.   Extremities: Extremities normal, atraumatic, no cyanosis or edema  Skin: Skin color, texture, turgor normal, no rashes or lesions  Lymph nodes: Cervical, supraclavicular, and axillary nodes normal  Neurologic: He is alert. He has normal reflexes.   Psychiatric: He has a normal mood and affect.

## 2021-07-06 VITALS
SYSTOLIC BLOOD PRESSURE: 113 MMHG | RESPIRATION RATE: 16 BRPM | BODY MASS INDEX: 28.68 KG/M2 | HEIGHT: 73 IN | HEART RATE: 63 BPM | DIASTOLIC BLOOD PRESSURE: 71 MMHG | WEIGHT: 216.4 LBS

## 2021-09-18 ENCOUNTER — HEALTH MAINTENANCE LETTER (OUTPATIENT)
Age: 51
End: 2021-09-18

## 2022-06-25 ENCOUNTER — HEALTH MAINTENANCE LETTER (OUTPATIENT)
Age: 52
End: 2022-06-25

## 2022-11-20 ENCOUNTER — HEALTH MAINTENANCE LETTER (OUTPATIENT)
Age: 52
End: 2022-11-20

## 2022-12-07 ENCOUNTER — OFFICE VISIT (OUTPATIENT)
Dept: FAMILY MEDICINE | Facility: CLINIC | Age: 52
End: 2022-12-07
Payer: COMMERCIAL

## 2022-12-07 VITALS
TEMPERATURE: 97.8 F | WEIGHT: 211.6 LBS | DIASTOLIC BLOOD PRESSURE: 56 MMHG | HEIGHT: 73 IN | SYSTOLIC BLOOD PRESSURE: 107 MMHG | HEART RATE: 63 BPM | OXYGEN SATURATION: 99 % | RESPIRATION RATE: 16 BRPM | BODY MASS INDEX: 28.04 KG/M2

## 2022-12-07 DIAGNOSIS — Z00.00 ROUTINE GENERAL MEDICAL EXAMINATION AT A HEALTH CARE FACILITY: Primary | ICD-10-CM

## 2022-12-07 DIAGNOSIS — N40.1 BENIGN NODULAR PROSTATIC HYPERPLASIA WITH LOWER URINARY TRACT SYMPTOMS: ICD-10-CM

## 2022-12-07 DIAGNOSIS — Z12.5 SCREENING FOR PROSTATE CANCER: ICD-10-CM

## 2022-12-07 DIAGNOSIS — E78.00 HYPERCHOLESTEREMIA: ICD-10-CM

## 2022-12-07 DIAGNOSIS — E88.2 LIPOMATOSIS: ICD-10-CM

## 2022-12-07 DIAGNOSIS — R06.83 SNORING: ICD-10-CM

## 2022-12-07 LAB
ALBUMIN SERPL BCG-MCNC: 4.2 G/DL (ref 3.5–5.2)
ALP SERPL-CCNC: 84 U/L (ref 40–129)
ALT SERPL W P-5'-P-CCNC: 15 U/L (ref 10–50)
ANION GAP SERPL CALCULATED.3IONS-SCNC: 10 MMOL/L (ref 7–15)
AST SERPL W P-5'-P-CCNC: 18 U/L (ref 10–50)
BILIRUB SERPL-MCNC: 0.9 MG/DL
BUN SERPL-MCNC: 10 MG/DL (ref 6–20)
CALCIUM SERPL-MCNC: 9.3 MG/DL (ref 8.6–10)
CHLORIDE SERPL-SCNC: 106 MMOL/L (ref 98–107)
CHOLEST SERPL-MCNC: 176 MG/DL
CREAT SERPL-MCNC: 1.17 MG/DL (ref 0.67–1.17)
DEPRECATED HCO3 PLAS-SCNC: 25 MMOL/L (ref 22–29)
ERYTHROCYTE [DISTWIDTH] IN BLOOD BY AUTOMATED COUNT: 13.7 % (ref 10–15)
GFR SERPL CREATININE-BSD FRML MDRD: 74 ML/MIN/1.73M2
GLUCOSE SERPL-MCNC: 95 MG/DL (ref 70–99)
HCT VFR BLD AUTO: 44.4 % (ref 40–53)
HDLC SERPL-MCNC: 38 MG/DL
HGB BLD-MCNC: 14.5 G/DL (ref 13.3–17.7)
LDLC SERPL CALC-MCNC: 115 MG/DL
MCH RBC QN AUTO: 27.6 PG (ref 26.5–33)
MCHC RBC AUTO-ENTMCNC: 32.7 G/DL (ref 31.5–36.5)
MCV RBC AUTO: 85 FL (ref 78–100)
NONHDLC SERPL-MCNC: 138 MG/DL
PLATELET # BLD AUTO: 209 10E3/UL (ref 150–450)
POTASSIUM SERPL-SCNC: 3.8 MMOL/L (ref 3.4–5.3)
PROT SERPL-MCNC: 6.9 G/DL (ref 6.4–8.3)
PSA SERPL-MCNC: 1.17 NG/ML (ref 0–3.5)
RBC # BLD AUTO: 5.25 10E6/UL (ref 4.4–5.9)
SODIUM SERPL-SCNC: 141 MMOL/L (ref 136–145)
TRIGL SERPL-MCNC: 114 MG/DL
WBC # BLD AUTO: 4.5 10E3/UL (ref 4–11)

## 2022-12-07 PROCEDURE — 80053 COMPREHEN METABOLIC PANEL: CPT | Performed by: FAMILY MEDICINE

## 2022-12-07 PROCEDURE — 85027 COMPLETE CBC AUTOMATED: CPT | Performed by: FAMILY MEDICINE

## 2022-12-07 PROCEDURE — 80061 LIPID PANEL: CPT | Performed by: FAMILY MEDICINE

## 2022-12-07 PROCEDURE — G0103 PSA SCREENING: HCPCS | Performed by: FAMILY MEDICINE

## 2022-12-07 PROCEDURE — 99396 PREV VISIT EST AGE 40-64: CPT | Performed by: FAMILY MEDICINE

## 2022-12-07 PROCEDURE — 36415 COLL VENOUS BLD VENIPUNCTURE: CPT | Performed by: FAMILY MEDICINE

## 2022-12-07 ASSESSMENT — ENCOUNTER SYMPTOMS
COUGH: 0
JOINT SWELLING: 0
DIZZINESS: 0
WEAKNESS: 0
ARTHRALGIAS: 1
HEADACHES: 0
EYE PAIN: 0
HEARTBURN: 0
PALPITATIONS: 0
DYSURIA: 0
SORE THROAT: 0
CONSTIPATION: 0
PARESTHESIAS: 0
FEVER: 0
HEMATOCHEZIA: 0
NERVOUS/ANXIOUS: 0
SHORTNESS OF BREATH: 0
ABDOMINAL PAIN: 0
DIARRHEA: 0
HEMATURIA: 0
CHILLS: 0
FREQUENCY: 1
MYALGIAS: 0
NAUSEA: 0

## 2022-12-07 NOTE — PROGRESS NOTES
SUBJECTIVE:   CC: Tushar is an 52 year old who presents for preventative health visit.   Patient has been advised of split billing requirements and indicates understanding: Yes  Healthy Habits:     Getting at least 3 servings of Calcium per day:  Yes    Bi-annual eye exam:  Yes    Dental care twice a year:  Yes    Sleep apnea or symptoms of sleep apnea:  Daytime drowsiness    Diet:  Regular (no restrictions)    Frequency of exercise:  2-3 days/week    Duration of exercise:  30-45 minutes    Taking medications regularly:  Yes    Medication side effects:  None    PHQ-2 Total Score: 0    Additional concerns today:  No      Tushar presents for his annual exam.  Overall he is doing well.  He was having some knee issues and had a procedure.  He states now the only time it bothers him is if he runs he will get some swelling and pain.  He is okay if he walks or rides a bike.  His wife does complain that he does have some snoring intermittently.  It does not sound like he has sleep apnea but will monitor for now.  He has a history of some bladder symptoms and had seen urology in 2015/2016.  They diagnosed him with lipomatosis and BPH.  His symptoms have been really stable so he has not returned to them.  He has some urgency but has not a quality-of-life issue.  We will continue to monitor for now and referral back to urology if symptoms worsen.        Today's PHQ-2 Score:   PHQ-2 ( 1999 [employer]) 12/7/2022   Q1: Little interest or pleasure in doing things 0   Q2: Feeling down, depressed or hopeless 0   PHQ-2 Score 0   Q1: Little interest or pleasure in doing things Not at all   Q2: Feeling down, depressed or hopeless Not at all   PHQ-2 Score 0       Have you ever done Advance Care Planning? (For example, a Health Directive, POLST, or a discussion with a medical provider or your loved ones about your wishes): No, advance care planning information given to patient to review.  Advanced care planning was discussed at today's  "visit.    Social History     Tobacco Use     Smoking status: Never     Smokeless tobacco: Never   Substance Use Topics     Alcohol use: Not on file         Alcohol Use 12/7/2022   Prescreen: >3 drinks/day or >7 drinks/week? Not Applicable       Last PSA:   Prostate Specific Antigen Screen   Date Value Ref Range Status   05/24/2021 1.1 0.0 - 3.5 ng/mL Final       Reviewed orders with patient. Reviewed health maintenance and updated orders accordingly - Yes  Lab work is in process    Reviewed and updated as needed this visit by clinical staff   Tobacco  Allergies  Meds              Reviewed and updated as needed this visit by Provider                     Review of Systems   Constitutional: Negative for chills and fever.   HENT: Negative for congestion, ear pain, hearing loss and sore throat.    Eyes: Negative for pain and visual disturbance.   Respiratory: Negative for cough and shortness of breath.    Cardiovascular: Negative for chest pain, palpitations and peripheral edema.   Gastrointestinal: Negative for abdominal pain, constipation, diarrhea, heartburn, hematochezia and nausea.   Genitourinary: Positive for frequency and urgency. Negative for dysuria, genital sores, hematuria, impotence and penile discharge.   Musculoskeletal: Positive for arthralgias. Negative for joint swelling and myalgias.   Skin: Negative for rash.   Neurological: Negative for dizziness, weakness, headaches and paresthesias.   Psychiatric/Behavioral: Negative for mood changes. The patient is not nervous/anxious.          OBJECTIVE:   /56   Pulse 63   Temp 97.8  F (36.6  C)   Resp 16   Ht 1.842 m (6' 0.5\")   Wt 96 kg (211 lb 9.6 oz)   SpO2 99%   BMI 28.30 kg/m      Physical Exam  GENERAL: healthy, alert and no distress  EYES: Eyes grossly normal to inspection, PERRL and conjunctivae and sclerae normal  HENT: ear canals and TM's normal, nose and mouth without ulcers or lesions  NECK: no adenopathy, no asymmetry, masses, or " scars and thyroid normal to palpation  RESP: lungs clear to auscultation - no rales, rhonchi or wheezes  CV: regular rate and rhythm, normal S1 S2, no S3 or S4, no murmur, click or rub, no peripheral edema and peripheral pulses strong  ABDOMEN: soft, nontender, no hepatosplenomegaly, no masses and bowel sounds normal  MS: no gross musculoskeletal defects noted, no edema  SKIN: no suspicious lesions or rashes  NEURO: Normal strength and tone, mentation intact and speech normal  PSYCH: mentation appears normal, affect normal/bright    Diagnostic Test Results:  Labs reviewed in Epic  Results for orders placed or performed in visit on 12/07/22 (from the past 24 hour(s))   CBC with platelets   Result Value Ref Range    WBC Count 4.5 4.0 - 11.0 10e3/uL    RBC Count 5.25 4.40 - 5.90 10e6/uL    Hemoglobin 14.5 13.3 - 17.7 g/dL    Hematocrit 44.4 40.0 - 53.0 %    MCV 85 78 - 100 fL    MCH 27.6 26.5 - 33.0 pg    MCHC 32.7 31.5 - 36.5 g/dL    RDW 13.7 10.0 - 15.0 %    Platelet Count 209 150 - 450 10e3/uL       ASSESSMENT/PLAN:   1. Routine general medical examination at a health care facility  Tushar presents for his annual exam.  As far as healthcare maintenance, his next colonoscopy is due in 2025.  He is up-to-date on immunizations.  We will do PSA screening today and lipid screening.  He is getting regular exercise.    2. Benign nodular prostatic hyperplasia with lower urinary tract symptoms  He has a history of some BPH and lipomatosis in the pelvic area.  His symptoms have been stable without medications.  Referral back to urology if symptoms worsen.  - Prostate Specific Antigen Screen; Future  - Prostate Specific Antigen Screen    3. Lipomatosis  As above    4. Snoring  He has occasional snoring but otherwise screens negative for sleep apnea.  He will monitor for now and if things are worsening we can readdress.    5. Hypercholesteremia  Has had some mildly elevated cholesterol.  We will recheck levels today.  - CBC  with platelets; Future  - Comprehensive metabolic panel; Future  - Lipid panel reflex to direct LDL Fasting; Future  - CBC with platelets  - Comprehensive metabolic panel  - Lipid panel reflex to direct LDL Fasting    6. Screening for prostate cancer              COUNSELING:   Reviewed preventive health counseling, as reflected in patient instructions       Regular exercise       Healthy diet/nutrition       Colorectal cancer screening       Prostate cancer screening        He reports that he has never smoked. He has never used smokeless tobacco.        Alice Domínguez MD  [facility]

## 2023-11-08 ENCOUNTER — PATIENT OUTREACH (OUTPATIENT)
Dept: CARE COORDINATION | Facility: CLINIC | Age: 53
End: 2023-11-08

## 2023-11-22 ENCOUNTER — PATIENT OUTREACH (OUTPATIENT)
Dept: CARE COORDINATION | Facility: CLINIC | Age: 53
End: 2023-11-22

## 2023-12-18 ENCOUNTER — HOSPITAL ENCOUNTER (OUTPATIENT)
Dept: GENERAL RADIOLOGY | Facility: HOSPITAL | Age: 53
Discharge: HOME | End: 2023-12-18
Attending: PHYSICIAN ASSISTANT | Admitting: PHYSICIAN ASSISTANT
Payer: COMMERCIAL

## 2023-12-18 ENCOUNTER — OFFICE VISIT (OUTPATIENT)
Dept: FAMILY MEDICINE | Facility: CLINIC | Age: 53
End: 2023-12-18
Payer: COMMERCIAL

## 2023-12-18 VITALS
SYSTOLIC BLOOD PRESSURE: 117 MMHG | TEMPERATURE: 97.3 F | HEART RATE: 74 BPM | DIASTOLIC BLOOD PRESSURE: 74 MMHG | OXYGEN SATURATION: 100 %

## 2023-12-18 DIAGNOSIS — M25.512 ACUTE PAIN OF LEFT SHOULDER: Primary | ICD-10-CM

## 2023-12-18 DIAGNOSIS — M25.512 ACUTE PAIN OF LEFT SHOULDER: ICD-10-CM

## 2023-12-18 PROCEDURE — 99203 OFFICE O/P NEW LOW 30 MIN: CPT | Performed by: PHYSICIAN ASSISTANT

## 2023-12-18 PROCEDURE — 73030 X-RAY EXAM OF SHOULDER: CPT | Mod: LT

## 2023-12-18 RX ORDER — ACETAMINOPHEN AND CODEINE PHOSPHATE 300; 30 MG/1; MG/1
1 TABLET ORAL EVERY 6 HOURS PRN
Qty: 10 TABLET | Refills: 0 | Status: SHIPPED | OUTPATIENT
Start: 2023-12-18 | End: 2023-12-21

## 2023-12-18 RX ORDER — METHYLPREDNISOLONE 4 MG
TABLET, DOSE PACK ORAL
Qty: 21 TABLET | Refills: 0 | Status: SHIPPED | OUTPATIENT
Start: 2023-12-18 | End: 2024-05-28

## 2023-12-18 NOTE — PROGRESS NOTES
Patient presents with:  Headache: X1 month  Shoulder Pain: L shoulder pain X2 months, getting worse  Pain: Pain in the armpits X2 months, comes and goes      (M25.512) Acute pain of left shoulder  (primary encounter diagnosis)  Comment:   Plan: XR Shoulder Left G/E 3 Views,         methylPREDNISolone (MEDROL DOSEPAK) 4 MG tablet        therapy pack, Orthopedic  Referral        acetaminophen-codeine (TYLENOL #3) 300-30 MG         per tablet            At the end of the encounter, I discussed results, diagnosis, medications. Discussed red flags for immediate return to clinic/ER, as well as indications for follow up if no improvement. Patient understood and agreed to plan. Patient was stable for discharge             SUBJECTIVE:   Tushar Nix is a 53 year old male who presents today with persistent left shoulder pain for the past 2 months.  Denies any specific injury.  Does do some training at the gym, but has not that been there for 2 months.  Denies any numbness or tingling in his upper extremity denies any weakness.  Denies any chest pain or shortness of breath.  Does complain of discomfort in his axilla sometimes bilaterally, no masses or skin lesions.      Patient Active Problem List   Diagnosis    BPH (benign prostatic hyperplasia)    Benign nodular prostatic hyperplasia with lower urinary tract symptoms    Left knee pain    Lipomatosis         Past Medical History:   Diagnosis Date    Lipomatosis          Current Outpatient Medications   Medication Sig Dispense Refill    Multiple Vitamins-Iron (DAILY-LUANNE/IRON/BETA-CAROTENE) TABS TAKE 1 TABLET BY MOUTH DAILY. (Patient not taking: Reported on 10/19/2020) 30 tablet 7     Social History     Tobacco Use    Smoking status: Never Smoker    Smokeless tobacco: Never Used   Substance Use Topics    Alcohol use: Not on file     Family History   Problem Relation Age of Onset    Diabetes Mother     Diabetes Father          ROS:    10 point ROS of systems  including Constitutional, Eyes, Respiratory, Cardiovascular, Gastroenterology, Genitourinary, Integumentary, Muscularskeletal, Psychiatric ,neurological were all negative except for pertinent positives noted in my HPI       OBJECTIVE:  /74 (BP Location: Right arm, Patient Position: Sitting, Cuff Size: Adult Regular)   Pulse 74   Temp 97.3  F (36.3  C) (Tympanic)   SpO2 100%   Physical Exam:  GENERAL APPEARANCE: healthy, alert and no distress  Extremities: Left shoulder: Tenderness over biceps insertion point on the lateral proximal humerus.  Positive drop arm test on the left.  Range of motion of the left shoulder is decreased secondary to discomfort.  NEURO: Normal strength and tone, sensory exam grossly normal,  normal speech and mentation  SKIN: no suspicious lesions or rashes    X-Ray was done, my findings are: no bony abnormalities

## 2023-12-18 NOTE — LETTER
December 18, 2023      Tushar Nix  [address] [address]        To Whom It May Concern:    Tushar Nix was seen in our clinic today. He may return to work tomorrow with the following restrictions: no lifting, pushing or pulling greater than or equal to 5 pounds with his left arm, until he has been further evaluated by Orthopedics.        Sincerely,              Gauri OTERO, PAANGLE     Electronically signed by Ramya Escobedo MD on 5/18/21 at 11:38 PM EDT

## 2023-12-18 NOTE — PATIENT INSTRUCTIONS
(M25.512) Acute pain of left shoulder  (primary encounter diagnosis)  Comment:   Plan: XR Shoulder Left G/E 3 Views,         methylPREDNISolone (MEDROL DOSEPAK) 4 MG tablet        therapy pack            Consistent with biceps tendonitis versus rotator cuff syndrome.      Referral to Orthopedics

## 2024-01-28 ENCOUNTER — HEALTH MAINTENANCE LETTER (OUTPATIENT)
Age: 54
End: 2024-01-28

## 2024-05-28 ENCOUNTER — OFFICE VISIT (OUTPATIENT)
Dept: FAMILY MEDICINE | Facility: CLINIC | Age: 54
End: 2024-05-28
Payer: COMMERCIAL

## 2024-05-28 VITALS
TEMPERATURE: 97.8 F | SYSTOLIC BLOOD PRESSURE: 101 MMHG | HEART RATE: 62 BPM | BODY MASS INDEX: 29.08 KG/M2 | OXYGEN SATURATION: 100 % | WEIGHT: 219.4 LBS | DIASTOLIC BLOOD PRESSURE: 65 MMHG | RESPIRATION RATE: 18 BRPM | HEIGHT: 73 IN

## 2024-05-28 DIAGNOSIS — Z12.5 SCREENING FOR PROSTATE CANCER: ICD-10-CM

## 2024-05-28 DIAGNOSIS — Z11.59 NEED FOR HEPATITIS C SCREENING TEST: ICD-10-CM

## 2024-05-28 DIAGNOSIS — N40.1 BENIGN NODULAR PROSTATIC HYPERPLASIA WITH LOWER URINARY TRACT SYMPTOMS: ICD-10-CM

## 2024-05-28 DIAGNOSIS — E88.2 LIPOMATOSIS: ICD-10-CM

## 2024-05-28 DIAGNOSIS — Z00.00 ROUTINE GENERAL MEDICAL EXAMINATION AT A HEALTH CARE FACILITY: Primary | ICD-10-CM

## 2024-05-28 DIAGNOSIS — E78.00 HYPERCHOLESTEREMIA: ICD-10-CM

## 2024-05-28 LAB
ALBUMIN SERPL BCG-MCNC: 4.2 G/DL (ref 3.5–5.2)
ALP SERPL-CCNC: 78 U/L (ref 40–150)
ALT SERPL W P-5'-P-CCNC: 12 U/L (ref 0–70)
ANION GAP SERPL CALCULATED.3IONS-SCNC: 9 MMOL/L (ref 7–15)
AST SERPL W P-5'-P-CCNC: 18 U/L (ref 0–45)
BILIRUB SERPL-MCNC: 0.4 MG/DL
BUN SERPL-MCNC: 14.1 MG/DL (ref 6–20)
CALCIUM SERPL-MCNC: 9.1 MG/DL (ref 8.6–10)
CHLORIDE SERPL-SCNC: 107 MMOL/L (ref 98–107)
CHOLEST SERPL-MCNC: 179 MG/DL
CREAT SERPL-MCNC: 1.26 MG/DL (ref 0.67–1.17)
DEPRECATED HCO3 PLAS-SCNC: 26 MMOL/L (ref 22–29)
EGFRCR SERPLBLD CKD-EPI 2021: 67 ML/MIN/1.73M2
ERYTHROCYTE [DISTWIDTH] IN BLOOD BY AUTOMATED COUNT: 14.1 % (ref 10–15)
FASTING STATUS PATIENT QL REPORTED: YES
FASTING STATUS PATIENT QL REPORTED: YES
GLUCOSE SERPL-MCNC: 100 MG/DL (ref 70–99)
HCT VFR BLD AUTO: 43.4 % (ref 40–53)
HCV AB SERPL QL IA: NONREACTIVE
HDLC SERPL-MCNC: 37 MG/DL
HGB BLD-MCNC: 13.7 G/DL (ref 13.3–17.7)
LDLC SERPL CALC-MCNC: 120 MG/DL
MCH RBC QN AUTO: 26.7 PG (ref 26.5–33)
MCHC RBC AUTO-ENTMCNC: 31.6 G/DL (ref 31.5–36.5)
MCV RBC AUTO: 84 FL (ref 78–100)
NONHDLC SERPL-MCNC: 142 MG/DL
PLATELET # BLD AUTO: 218 10E3/UL (ref 150–450)
POTASSIUM SERPL-SCNC: 4.3 MMOL/L (ref 3.4–5.3)
PROT SERPL-MCNC: 7 G/DL (ref 6.4–8.3)
PSA SERPL DL<=0.01 NG/ML-MCNC: 1.33 NG/ML (ref 0–3.5)
RBC # BLD AUTO: 5.14 10E6/UL (ref 4.4–5.9)
SODIUM SERPL-SCNC: 142 MMOL/L (ref 135–145)
TRIGL SERPL-MCNC: 110 MG/DL
WBC # BLD AUTO: 3.5 10E3/UL (ref 4–11)

## 2024-05-28 PROCEDURE — 85027 COMPLETE CBC AUTOMATED: CPT | Performed by: FAMILY MEDICINE

## 2024-05-28 PROCEDURE — G0103 PSA SCREENING: HCPCS | Performed by: FAMILY MEDICINE

## 2024-05-28 PROCEDURE — 86803 HEPATITIS C AB TEST: CPT | Performed by: FAMILY MEDICINE

## 2024-05-28 PROCEDURE — 99396 PREV VISIT EST AGE 40-64: CPT | Mod: 25 | Performed by: FAMILY MEDICINE

## 2024-05-28 PROCEDURE — 80061 LIPID PANEL: CPT | Performed by: FAMILY MEDICINE

## 2024-05-28 PROCEDURE — 80053 COMPREHEN METABOLIC PANEL: CPT | Performed by: FAMILY MEDICINE

## 2024-05-28 PROCEDURE — 90471 IMMUNIZATION ADMIN: CPT | Performed by: FAMILY MEDICINE

## 2024-05-28 PROCEDURE — 36415 COLL VENOUS BLD VENIPUNCTURE: CPT | Performed by: FAMILY MEDICINE

## 2024-05-28 PROCEDURE — 90750 HZV VACC RECOMBINANT IM: CPT | Performed by: FAMILY MEDICINE

## 2024-05-28 SDOH — HEALTH STABILITY: PHYSICAL HEALTH: ON AVERAGE, HOW MANY MINUTES DO YOU ENGAGE IN EXERCISE AT THIS LEVEL?: 60 MIN

## 2024-05-28 SDOH — HEALTH STABILITY: PHYSICAL HEALTH: ON AVERAGE, HOW MANY DAYS PER WEEK DO YOU ENGAGE IN MODERATE TO STRENUOUS EXERCISE (LIKE A BRISK WALK)?: 4 DAYS

## 2024-05-28 ASSESSMENT — SOCIAL DETERMINANTS OF HEALTH (SDOH): HOW OFTEN DO YOU GET TOGETHER WITH FRIENDS OR RELATIVES?: ONCE A WEEK

## 2024-05-28 NOTE — PATIENT INSTRUCTIONS
"Preventive Care Advice   This is general advice we often give to help people stay healthy. Your care team may have specific advice just for you. Please talk to your care team about your own preventive care needs.  Lifestyle  Exercise at least 150 minutes each week (30 minutes a day, 5 days a week).  Do muscle strengthening activities 2 days a week. These help control your weight and prevent disease.  No smoking.  Wear sunscreen to prevent skin cancer.  Have your home tested for radon every 2 to 5 years. Radon is a colorless, odorless gas that can harm your lungs. To learn more, go to www.health.[facility].mn. and search for \"Radon in Homes.\"  Keep guns unloaded and locked up in a safe place like a safe or gun vault, or, use a gun lock and hide the keys. Always lock away bullets separately. To learn more, visit [place].gov and search for \"safe gun storage.\"  Nutrition  Eat 5 or more servings of fruits and vegetables each day.  Try wheat bread, brown rice and whole grain pasta (instead of white bread, rice, and pasta).  Get enough calcium and vitamin D. Check the label on foods and aim for 100% of the RDA (recommended daily allowance).  Regular exams  Have a dental exam and cleaning every 6 months.  See your health care team every year to talk about:  Any changes in your health.  Any medicines your care team has prescribed.  Preventive care, family planning, and ways to prevent chronic diseases.  Shots (vaccines)   HPV shots (up to age 26), if you've never had them before.  Hepatitis B shots (up to age 59), if you've never had them before.  COVID-19 shot: Get this shot when it's due.  Flu shot: Get a flu shot every year.  Tetanus shot: Get a tetanus shot every 10 years.  Pneumococcal, hepatitis A, and RSV shots: Ask your care team if you need these based on your risk.  Shingles shot (for age 50 and up).  General health tests  Diabetes screening:  Starting at age 35, Get screened for diabetes at least every 3 years.  If " you are younger than age 35, ask your care team if you should be screened for diabetes.  Cholesterol test: At age 39, start having a cholesterol test every 5 years, or more often if advised.  Bone density scan (DEXA): At age 50, ask your care team if you should have this scan for osteoporosis (brittle bones).  Hepatitis C: Get tested at least once in your life.  Abdominal aortic aneurysm screening: Talk to your doctor about having this screening if you:  Have ever smoked; and  Are biologically male; and  Are between the ages of 65 and 75.  STIs (sexually transmitted infections)  Before age 24: Ask your care team if you should be screened for STIs.  After age 24: Get screened for STIs if you're at risk. You are at risk for STIs (including HIV) if:  You are sexually active with more than one person.  You don't use condoms every time.  You or a partner was diagnosed with a sexually transmitted infection.  If you are at risk for HIV, ask about PrEP medicine to prevent HIV.  Get tested for HIV at least once in your life, whether you are at risk for HIV or not.  Cancer screening tests  Cervical cancer screening: If you have a cervix, begin getting regular cervical cancer screening tests at age 21. Most people who have regular screenings with normal results can stop after age 65. Talk about this with your provider.  Breast cancer scan (mammogram): If you've ever had breasts, begin having regular mammograms starting at age 40. This is a scan to check for breast cancer.  Colon cancer screening: It is important to start screening for colon cancer at age 45.  Have a colonoscopy test every 10 years (or more often if you're at risk) Or, ask your provider about stool tests like a FIT test every year or Cologuard test every 3 years.  To learn more about your testing options, visit: www.Patient Feed/747008.pdf.  For help making a decision, visit: angela/vo34355.  Prostate cancer screening test: If you have a prostate and are age 55  to 69, ask your provider if you would benefit from a yearly prostate cancer screening test.  Lung cancer screening: If you are a current or former smoker age 50 to 80, ask your care team if ongoing lung cancer screenings are right for you.  For informational purposes only. Not to replace the advice of your health care provider. Copyright   2023 [employer]. All rights reserved. Clinically reviewed by the [facility] Transitions Program. Veracode [id number] - REV 04/24.

## 2024-05-28 NOTE — PROGRESS NOTES
Preventive Care Visit  [facility]  Alice Domínguez MD, Family Medicine  May 28, 2024      1. Routine general medical examination at a health care facility  Tushar comes in today for his annual exam.  As far as healthcare maintenance, his colonoscopy is due in 2025.  Will do PSA screening today.  Will start his shingles vaccine.  He is getting regular exercise and seeing an eye doctor.    2. Need for hepatitis C screening test    - Hepatitis C Screen Reflex to HCV RNA Quant and Genotype; Future    3. Screening for prostate cancer    - Prostate Specific Antigen Screen; Future    4. Benign nodular prostatic hyperplasia with lower urinary tract symptoms  He has seen urology in the past but things are well-controlled at the moment.    5. Lipomatosis  He had followed with urology but currently not having any issues.    6. Hypercholesteremia  Had mildly cholesterol last year but his ASCVD score was still quite low.  Will recheck again this year and make recommendations.  - CBC with platelets; Future  - Comprehensive metabolic panel; Future  - Lipid panel reflex to direct LDL Fasting; Future      Subjective   Tushar is a 53 year old, presenting for the following:  Physical (Fasting/)        5/28/2024     6:51 AM   Additional Questions   Roomed by         Health Care Directive  Patient does not have a Health Care Directive or Living Will: Discussed advance care planning with patient; information given to patient to review.    HPI  Tushar comes in today for his annual exam.  He is overall doing well without any concerns today.            5/28/2024   General Health   How would you rate your overall physical health? Good   Feel stress (tense, anxious, or unable to sleep) Not at all         5/28/2024   Nutrition   Three or more servings of calcium each day? Yes   Diet: Regular (no restrictions)   How many servings of fruit and vegetables per day? (!) 2-3   How many sweetened beverages each day? 0-1          5/28/2024   Exercise   Days per week of moderate/strenous exercise 4 days   Average minutes spent exercising at this level 60 min         5/28/2024   Social Factors   Frequency of gathering with friends or relatives Once a week   Worry food won't last until get money to buy more No   Food not last or not have enough money for food? No   Do you have housing?  Yes   Are you worried about losing your housing? No   Lack of transportation? No   Unable to get utilities (heat,electricity)? No         5/28/2024   Fall Risk   Fallen 2 or more times in the past year? No   Trouble with walking or balance? No          5/28/2024   Dental   Dentist two times every year? Yes         5/28/2024   TB Screening   Were you born outside of the [place]? Yes         Today's PHQ-2 Score:       5/28/2024     6:51 AM   PHQ-2 ( 1999 [employer])   Q1: Little interest or pleasure in doing things 0   Q2: Feeling down, depressed or hopeless 0   PHQ-2 Score 0   Q1: Little interest or pleasure in doing things Not at all   Q2: Feeling down, depressed or hopeless Not at all   PHQ-2 Score 0           5/28/2024   Substance Use   Alcohol more than 3/day or more than 7/wk Not Applicable   Do you use any other substances recreationally? No     Social History     Tobacco Use    Smoking status: Never    Smokeless tobacco: Never   Vaping Use    Vaping status: Never Used             5/28/2024   One time HIV Screening   Previous HIV test? No         5/28/2024   STI Screening   New sexual partner(s) since last STI/HIV test? No   Last PSA:   Prostate Specific Antigen Screen   Date Value Ref Range Status   12/07/2022 1.17 0.00 - 3.50 ng/mL Final   05/24/2021 1.1 0.0 - 3.5 ng/mL Final     ASCVD Risk   The 10-year ASCVD risk score (Karuna TRAN, et al., 2019) is: 4.6%    Values used to calculate the score:      Age: 53 years      Sex: Male      Is Non- : Yes      Diabetic: No      Tobacco smoker: No      Systolic Blood Pressure: 101  "mmHg      Is BP treated: No      HDL Cholesterol: 38 mg/dL      Total Cholesterol: 176 mg/dL           Reviewed and updated as needed this visit by Provider                    Lab work is in process      Review of Systems  Constitutional, HEENT, cardiovascular, pulmonary, GI, , musculoskeletal, neuro, skin, endocrine and psych systems are negative, except as otherwise noted.     Objective    Exam  /65   Pulse 62   Temp 97.8  F (36.6  C)   Resp 18   Ht 1.842 m (6' 0.5\")   Wt 99.5 kg (219 lb 6.4 oz)   SpO2 100%   BMI 29.35 kg/m     Estimated body mass index is 29.35 kg/m  as calculated from the following:    Height as of this encounter: 1.842 m (6' 0.5\").    Weight as of this encounter: 99.5 kg (219 lb 6.4 oz).    Physical Exam  GENERAL: alert and no distress  EYES: Eyes grossly normal to inspection, PERRL and conjunctivae and sclerae normal  HENT: ear canals and TM's normal, nose and mouth without ulcers or lesions  NECK: no adenopathy, no asymmetry, masses, or scars  RESP: lungs clear to auscultation - no rales, rhonchi or wheezes  CV: regular rate and rhythm, normal S1 S2, no S3 or S4, no murmur, click or rub, no peripheral edema  ABDOMEN: soft, nontender, no hepatosplenomegaly, no masses and bowel sounds normal  MS: no gross musculoskeletal defects noted, no edema  SKIN: no suspicious lesions or rashes  NEURO: Normal strength and tone, mentation intact and speech normal  PSYCH: mentation appears normal, affect normal/bright        Signed Electronically by: Alice Domínguez MD    "

## 2024-05-28 NOTE — PROGRESS NOTES
Prior to immunization administration, verified patients identity using patient s name and date of birth. Please see Immunization Activity for additional information.     Screening Questionnaire for Adult Immunization    Are you sick today?   No   Do you have allergies to medications, food, a vaccine component or latex?   No   Have you ever had a serious reaction after receiving a vaccination?   No   Do you have a long-term health problem with heart, lung, kidney, or metabolic disease (e.g., diabetes), asthma, a blood disorder, no spleen, complement component deficiency, a cochlear implant, or a spinal fluid leak?  Are you on long-term aspirin therapy?   No   Do you have cancer, leukemia, HIV/AIDS, or any other immune system problem?   No   Do you have a parent, brother, or sister with an immune system problem?   No   In the past 3 months, have you taken medications that affect  your immune system, such as prednisone, other steroids, or anticancer drugs; drugs for the treatment of rheumatoid arthritis, Crohn s disease, or psoriasis; or have you had radiation treatments?   No   Have you had a seizure, or a brain or other nervous system problem?   No   During the past year, have you received a transfusion of blood or blood    products, or been given immune (gamma) globulin or antiviral drug?   No   For women: Are you pregnant or is there a chance you could become       pregnant during the next month?   No   Have you received any vaccinations in the past 4 weeks?   No     Immunization questionnaire answers were all negative.      Patient instructed to remain in clinic for 15 minutes afterwards, and to report any adverse reactions.     Screening performed by Georgia Boss MA on 5/28/2024 at 7:15 AM.

## 2024-07-31 ENCOUNTER — HOSPITAL ENCOUNTER (EMERGENCY)
Facility: CLINIC | Age: 54
Discharge: HOME | End: 2024-07-31
Attending: FAMILY MEDICINE | Admitting: FAMILY MEDICINE
Payer: COMMERCIAL

## 2024-07-31 VITALS
WEIGHT: 220 LBS | HEART RATE: 65 BPM | DIASTOLIC BLOOD PRESSURE: 90 MMHG | HEIGHT: 74 IN | OXYGEN SATURATION: 99 % | BODY MASS INDEX: 28.23 KG/M2 | TEMPERATURE: 97.8 F | RESPIRATION RATE: 16 BRPM | SYSTOLIC BLOOD PRESSURE: 127 MMHG

## 2024-07-31 DIAGNOSIS — S06.0X0A CONCUSSION WITHOUT LOSS OF CONSCIOUSNESS, INITIAL ENCOUNTER: ICD-10-CM

## 2024-07-31 PROCEDURE — 99283 EMERGENCY DEPT VISIT LOW MDM: CPT | Performed by: FAMILY MEDICINE

## 2024-07-31 ASSESSMENT — COLUMBIA-SUICIDE SEVERITY RATING SCALE - C-SSRS
1. IN THE PAST MONTH, HAVE YOU WISHED YOU WERE DEAD OR WISHED YOU COULD GO TO SLEEP AND NOT WAKE UP?: NO
6. HAVE YOU EVER DONE ANYTHING, STARTED TO DO ANYTHING, OR PREPARED TO DO ANYTHING TO END YOUR LIFE?: NO
2. HAVE YOU ACTUALLY HAD ANY THOUGHTS OF KILLING YOURSELF IN THE PAST MONTH?: NO

## 2024-07-31 ASSESSMENT — ACTIVITIES OF DAILY LIVING (ADL)
ADLS_ACUITY_SCORE: 35
ADLS_ACUITY_SCORE: 35

## 2024-07-31 NOTE — ED PROVIDER NOTES
"  HPI   Patient is a 54-year-old male presenting with head trauma.  Per my chart review, the patient has a known history of BPH and lipomatosis.  No prescription medication on a regular basis.  No medication for anticoagulation.  No alcohol.    The patient was on the job 5 days ago.  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  He describes his head as \"seeing black for a while, I was really christian for a brief period of time.\"  There was no falling down.  There was no loss of consciousness.  Patient had headache after the event.  He has continued to have headaches that occur mostly in the morning and then improve over the course of the day.  The headache is on the top of his head.  He denies associated nausea or vomiting.  No vision changes.  No dizziness.  No falling down or stumbling.  No stroke symptoms described.  No fever.  No nasal congestion or facial pressure or pain.  No vision changes.      Allergies:  No Known Allergies  Problem List:    Patient Active Problem List    Diagnosis Date Noted    Lipomatosis      Priority: Medium    Left knee pain 12/26/2019     Priority: Medium    Benign nodular prostatic hyperplasia with lower urinary tract symptoms 11/09/2015     Priority: Medium    BPH (benign prostatic hyperplasia) 10/28/2015     Priority: Medium      Past Medical History:    Past Medical History:   Diagnosis Date    Lipomatosis      Past Surgical History:    No past surgical history on file.  Family History:    Family History   Problem Relation Age of Onset    Diabetes No family hx of     Coronary Artery Disease No family hx of     Hypertension No family hx of     Hyperlipidemia No family hx of     Cerebrovascular Disease No family hx of     Breast Cancer No family hx of     Colon Cancer No family hx of     Prostate Cancer No family hx of     Other Cancer No family hx of     Depression No family hx of     Anxiety " "Disorder No family hx of     Mental Illness No family hx of     Substance Abuse No family hx of     Anesthesia Reaction No family hx of     Asthma No family hx of     Osteoporosis No family hx of     Genetic Disorder No family hx of     Thyroid Disease No family hx of     Obesity No family hx of      Social History:  Marital Status:   [2]  Social History     Tobacco Use    Smoking status: Never    Smokeless tobacco: Never   Vaping Use    Vaping status: Never Used      Medications:    No current outpatient medications on file.    Review of Systems   All other systems reviewed and are negative.      PE   BP: (!) 127/90  Pulse: 65  Temp: 97.8  F (36.6  C)  Resp: 16  Height: 188 cm (6' 2\")  Weight: 99.8 kg (220 lb)  SpO2: 99 %  Physical Exam  Vitals and nursing note reviewed.   Constitutional:       General: He is not in acute distress.  HENT:      Head:      Comments: There is an area on the top of his head that is swollen.     Right Ear: External ear normal.      Left Ear: External ear normal.      Nose: Nose normal.      Mouth/Throat:      Mouth: Mucous membranes are moist.      Pharynx: Oropharynx is clear.   Eyes:      General: No scleral icterus.     Extraocular Movements: Extraocular movements intact.      Conjunctiva/sclera: Conjunctivae normal.      Pupils: Pupils are equal, round, and reactive to light.   Cardiovascular:      Rate and Rhythm: Normal rate.   Pulmonary:      Effort: Pulmonary effort is normal. No respiratory distress.   Musculoskeletal:         General: Normal range of motion.      Cervical back: Normal range of motion.   Skin:     General: Skin is warm and dry.   Neurological:      Mental Status: He is alert and oriented to person, place, and time.   Psychiatric:         Behavior: Behavior normal.         ED COURSE and MDM   1603.  Patient with head trauma and headache that followed.  Concussion likely.  Referral order placed for follow-up.  Work restrictions were not requested by the " patient.  He tells me that he feels like he can continue with his work as is and that restrictions are not needed.  Ibuprofen or Tylenol.    Electronic medical chart reviewed, including medical problems, medications, medical allergies, social history.  Recent hospitalizations and surgical procedures reviewed.  Recent clinic visits and consultations reviewed.  Recent labs and test results reviewed.  Nursing notes reviewed.    The patient, their parent if applicable, and/or their medical decision maker(s) and I have reviewed all of the available historical information, applicable PMH, physical exam findings, and objective diagnostic data gathered during this ED visit.  We then discussed all work-up options and then together agreed upon the course taken during this visit.  The ultimate disposition and plan was a cooperative decision made between myself and the patient, their parent if applicable, and/or their legal decision maker(s).  The risks and benefits of all decisions made during this visit were discussed to the best of my abilities given the circumstances, and all parties are understanding of the pertinent ramifications of these decisions.      LABS  Labs Ordered and Resulted from Time of ED Arrival to Time of ED Departure - No data to display    IMAGING  Images reviewed by me.  Radiology report also reviewed.  No orders to display       Procedures    Medications - No data to display      IMPRESSION       ICD-10-CM    1. Concussion without loss of consciousness, initial encounter  S06.0X0A Concussion  Referral               Medication List      There are no discharge medications for this visit.                             Lew Mcgraw MD  07/31/24 1824

## 2024-07-31 NOTE — ED TRIAGE NOTES
Pt presents with headache for 5 days to top of head. Pt hit head while at work on low ceiling. Pt has work comp paperwork. No vision changes. No neck pain. No confusion.     Triage Assessment (Adult)       Row Name 07/31/24 1509          Triage Assessment    Airway WDL WDL        Respiratory WDL    Respiratory WDL WDL        Peripheral/Neurovascular WDL    Peripheral Neurovascular WDL WDL        Cognitive/Neuro/Behavioral WDL    Cognitive/Neuro/Behavioral WDL X  headache        Pupils (CN II)    Pupil PERRLA yes     Pupil Size Left 3 mm     Pupil Size Right 3 mm        Z[place]ika Coma Scale    Best Eye Response 4-->(E4) spontaneous     Best Motor Response 6-->(M6) obeys commands     Best Verbal Response 5-->(V5) oriented     [place] Coma Scale Score 15

## 2024-07-31 NOTE — DISCHARGE INSTRUCTIONS
RETURN TO THE EMERGENCY ROOM FOR THE FOLLOWING:    Severely worsened pain, concerning changes in behavior/confusion, fainting, repeated vomiting, or at anytime for any concern.    FOLLOW UP:    Concussion clinic referral order placed at the time of discharge, expect a phone call within the next few days to help with scheduling.  Consider follow-up with this clinic for further discussion.  If your symptoms have resolved, you can cancel the appointment.    TREATMENT RECOMMENDATIONS:    Ibuprofen 600 mg every six hours for pain (7 days duration).  Tylenol 1000 mg every six hours for pain (7 days duration).  Therefore, you can alternate these every three hours and do it safely.  ONLY take these medications if it is safe to do so given your medical history.    NURSE ADVICE LINE:  [phone] or [phone]

## 2024-07-31 NOTE — LETTER
[facility] EMERGENCY DEPT  [address] [address]  [phone]      2024    Tushar Nix   [facility] [address]  [phone] (home)     : 1970      To Whom it may concern:    Tushar Nix was seen in our Emergency Department today, 2024 for an injury that was reported to be work related.  He is able to return to work immediately without restriction.  He may need follow-up with a concussion clinic if symptoms or not improving, referral order placed at the time of discharge.      Sincerely,    Lew Mcgraw MD

## 2024-08-28 ENCOUNTER — ALLIED HEALTH/NURSE VISIT (OUTPATIENT)
Dept: FAMILY MEDICINE | Facility: CLINIC | Age: 54
End: 2024-08-28
Payer: COMMERCIAL

## 2024-08-28 DIAGNOSIS — Z23 ENCOUNTER FOR IMMUNIZATION: Primary | ICD-10-CM

## 2024-08-28 PROCEDURE — 90750 HZV VACC RECOMBINANT IM: CPT

## 2024-08-28 PROCEDURE — 99207 PR NO CHARGE NURSE ONLY: CPT

## 2024-08-28 PROCEDURE — 90471 IMMUNIZATION ADMIN: CPT

## 2024-08-28 NOTE — PROGRESS NOTES
Prior to immunization administration, verified patients identity using patient s name and date of birth. Please see Immunization Activity for additional information.     Screening Questionnaire for Adult Immunization    Are you sick today?   No   Do you have allergies to medications, food, a vaccine component or latex?   No   Have you ever had a serious reaction after receiving a vaccination?   No   Do you have a long-term health problem with heart, lung, kidney, or metabolic disease (e.g., diabetes), asthma, a blood disorder, no spleen, complement component deficiency, a cochlear implant, or a spinal fluid leak?  Are you on long-term aspirin therapy?   No   Do you have cancer, leukemia, HIV/AIDS, or any other immune system problem?   No   Do you have a parent, brother, or sister with an immune system problem?   No   In the past 3 months, have you taken medications that affect  your immune system, such as prednisone, other steroids, or anticancer drugs; drugs for the treatment of rheumatoid arthritis, Crohn s disease, or psoriasis; or have you had radiation treatments?   No   Have you had a seizure, or a brain or other nervous system problem?   No   During the past year, have you received a transfusion of blood or blood    products, or been given immune (gamma) globulin or antiviral drug?   No   For women: Are you pregnant or is there a chance you could become       pregnant during the next month?   No   Have you received any vaccinations in the past 4 weeks?   No     Immunization questionnaire answers were all negative.    I have reviewed the following standing orders:   This patient is due and qualifies for the Zoster vaccine.    Click here for Zoster Standing Order    I have reviewed the vaccines inclusion and exclusion criteria; No concerns regarding eligibility.     Patient instructed to remain in clinic for 15 minutes afterwards, and to report any adverse reactions.     Screening performed by Traci Wang  MA on 8/28/2024 at 11:09 AM.

## 2025-01-04 ENCOUNTER — PATIENT OUTREACH (OUTPATIENT)
Dept: CARE COORDINATION | Facility: CLINIC | Age: 55
End: 2025-01-04
Payer: COMMERCIAL

## 2025-04-03 ENCOUNTER — TRANSFERRED RECORDS (OUTPATIENT)
Dept: ADMINISTRATIVE | Facility: CLINIC | Age: 55
End: 2025-04-03
Payer: COMMERCIAL

## 2025-04-07 PROBLEM — D12.6 COLON ADENOMAS: Status: ACTIVE | Noted: 2025-04-07

## 2025-04-08 ENCOUNTER — PATIENT OUTREACH (OUTPATIENT)
Dept: GASTROENTEROLOGY | Facility: CLINIC | Age: 55
End: 2025-04-08
Payer: COMMERCIAL

## 2025-07-12 ENCOUNTER — HEALTH MAINTENANCE LETTER (OUTPATIENT)
Age: 55
End: 2025-07-12

## 2025-08-10 SDOH — HEALTH STABILITY: PHYSICAL HEALTH: ON AVERAGE, HOW MANY DAYS PER WEEK DO YOU ENGAGE IN MODERATE TO STRENUOUS EXERCISE (LIKE A BRISK WALK)?: 3 DAYS

## 2025-08-10 SDOH — HEALTH STABILITY: PHYSICAL HEALTH: ON AVERAGE, HOW MANY MINUTES DO YOU ENGAGE IN EXERCISE AT THIS LEVEL?: 30 MIN

## 2025-08-10 ASSESSMENT — SOCIAL DETERMINANTS OF HEALTH (SDOH): HOW OFTEN DO YOU GET TOGETHER WITH FRIENDS OR RELATIVES?: THREE TIMES A WEEK

## 2025-08-13 ENCOUNTER — OFFICE VISIT (OUTPATIENT)
Dept: FAMILY MEDICINE | Facility: CLINIC | Age: 55
End: 2025-08-13
Payer: COMMERCIAL

## 2025-08-13 VITALS
RESPIRATION RATE: 12 BRPM | SYSTOLIC BLOOD PRESSURE: 120 MMHG | HEART RATE: 58 BPM | DIASTOLIC BLOOD PRESSURE: 82 MMHG | HEIGHT: 73 IN | BODY MASS INDEX: 30.11 KG/M2 | TEMPERATURE: 97.1 F | OXYGEN SATURATION: 99 % | WEIGHT: 227.2 LBS

## 2025-08-13 DIAGNOSIS — Z00.00 ROUTINE GENERAL MEDICAL EXAMINATION AT A HEALTH CARE FACILITY: Primary | ICD-10-CM

## 2025-08-13 DIAGNOSIS — R39.15 URINARY URGENCY: ICD-10-CM

## 2025-08-13 DIAGNOSIS — R73.01 ELEVATED FASTING BLOOD SUGAR: ICD-10-CM

## 2025-08-13 DIAGNOSIS — E78.00 HYPERCHOLESTEREMIA: ICD-10-CM

## 2025-08-13 DIAGNOSIS — Z12.5 SCREENING FOR PROSTATE CANCER: ICD-10-CM

## 2025-08-13 DIAGNOSIS — Z13.1 SCREENING FOR DIABETES MELLITUS: ICD-10-CM

## 2025-08-13 DIAGNOSIS — E88.2 PELVIC LIPOMATOSIS: ICD-10-CM

## 2025-08-13 LAB
ANION GAP SERPL CALCULATED.3IONS-SCNC: 11 MMOL/L (ref 7–15)
BUN SERPL-MCNC: 10.7 MG/DL (ref 6–20)
CALCIUM SERPL-MCNC: 9.4 MG/DL (ref 8.8–10.4)
CHLORIDE SERPL-SCNC: 106 MMOL/L (ref 98–107)
CHOLEST SERPL-MCNC: 201 MG/DL
CREAT SERPL-MCNC: 1.22 MG/DL (ref 0.67–1.17)
EGFRCR SERPLBLD CKD-EPI 2021: 69 ML/MIN/1.73M2
EST. AVERAGE GLUCOSE BLD GHB EST-MCNC: 123 MG/DL
FASTING STATUS PATIENT QL REPORTED: YES
FASTING STATUS PATIENT QL REPORTED: YES
GLUCOSE SERPL-MCNC: 106 MG/DL (ref 70–99)
HBA1C MFR BLD: 5.9 % (ref 0–5.6)
HCO3 SERPL-SCNC: 25 MMOL/L (ref 22–29)
HDLC SERPL-MCNC: 36 MG/DL
LDLC SERPL CALC-MCNC: 141 MG/DL
NONHDLC SERPL-MCNC: 165 MG/DL
POTASSIUM SERPL-SCNC: 4.2 MMOL/L (ref 3.4–5.3)
PSA SERPL DL<=0.01 NG/ML-MCNC: 1.34 NG/ML (ref 0–3.5)
SODIUM SERPL-SCNC: 142 MMOL/L (ref 135–145)
TRIGL SERPL-MCNC: 121 MG/DL

## 2025-08-13 PROCEDURE — 36415 COLL VENOUS BLD VENIPUNCTURE: CPT | Performed by: FAMILY MEDICINE

## 2025-08-13 PROCEDURE — 83036 HEMOGLOBIN GLYCOSYLATED A1C: CPT | Performed by: FAMILY MEDICINE

## 2025-08-13 PROCEDURE — 80061 LIPID PANEL: CPT | Performed by: FAMILY MEDICINE

## 2025-08-13 PROCEDURE — G0103 PSA SCREENING: HCPCS | Performed by: FAMILY MEDICINE

## 2025-08-13 PROCEDURE — 80048 BASIC METABOLIC PNL TOTAL CA: CPT | Performed by: FAMILY MEDICINE

## 2025-08-13 ASSESSMENT — PAIN SCALES - GENERAL: PAINLEVEL_OUTOF10: NO PAIN (0)

## 2025-08-14 ENCOUNTER — PATIENT OUTREACH (OUTPATIENT)
Dept: CARE COORDINATION | Facility: CLINIC | Age: 55
End: 2025-08-14
Payer: COMMERCIAL

## 2025-08-18 ENCOUNTER — PATIENT OUTREACH (OUTPATIENT)
Dept: CARE COORDINATION | Facility: CLINIC | Age: 55
End: 2025-08-18
Payer: COMMERCIAL